# Patient Record
Sex: MALE | Race: WHITE
[De-identification: names, ages, dates, MRNs, and addresses within clinical notes are randomized per-mention and may not be internally consistent; named-entity substitution may affect disease eponyms.]

---

## 2018-04-13 ENCOUNTER — HOSPITAL ENCOUNTER (INPATIENT)
Dept: HOSPITAL 17 - NEPC | Age: 83
LOS: 5 days | Discharge: HOME | DRG: 175 | End: 2018-04-18
Attending: HOSPITALIST | Admitting: HOSPITALIST
Payer: MEDICARE

## 2018-04-13 VITALS
HEART RATE: 68 BPM | DIASTOLIC BLOOD PRESSURE: 75 MMHG | RESPIRATION RATE: 18 BRPM | SYSTOLIC BLOOD PRESSURE: 129 MMHG | TEMPERATURE: 98.1 F | OXYGEN SATURATION: 95 %

## 2018-04-13 VITALS — HEIGHT: 75 IN | BODY MASS INDEX: 31.99 KG/M2 | WEIGHT: 257.28 LBS

## 2018-04-13 DIAGNOSIS — I27.20: ICD-10-CM

## 2018-04-13 DIAGNOSIS — E55.9: ICD-10-CM

## 2018-04-13 DIAGNOSIS — I50.22: ICD-10-CM

## 2018-04-13 DIAGNOSIS — I82.443: ICD-10-CM

## 2018-04-13 DIAGNOSIS — E66.9: ICD-10-CM

## 2018-04-13 DIAGNOSIS — R31.9: ICD-10-CM

## 2018-04-13 DIAGNOSIS — R55: ICD-10-CM

## 2018-04-13 DIAGNOSIS — I82.4Z2: ICD-10-CM

## 2018-04-13 DIAGNOSIS — N40.1: ICD-10-CM

## 2018-04-13 DIAGNOSIS — I08.1: ICD-10-CM

## 2018-04-13 DIAGNOSIS — I49.3: ICD-10-CM

## 2018-04-13 DIAGNOSIS — Z87.891: ICD-10-CM

## 2018-04-13 DIAGNOSIS — M54.9: ICD-10-CM

## 2018-04-13 DIAGNOSIS — G89.21: ICD-10-CM

## 2018-04-13 DIAGNOSIS — R04.2: ICD-10-CM

## 2018-04-13 DIAGNOSIS — I48.91: ICD-10-CM

## 2018-04-13 DIAGNOSIS — Z96.653: ICD-10-CM

## 2018-04-13 DIAGNOSIS — E78.5: ICD-10-CM

## 2018-04-13 DIAGNOSIS — I11.0: ICD-10-CM

## 2018-04-13 DIAGNOSIS — I26.92: Primary | ICD-10-CM

## 2018-04-13 DIAGNOSIS — M10.9: ICD-10-CM

## 2018-04-13 DIAGNOSIS — I82.432: ICD-10-CM

## 2018-04-13 DIAGNOSIS — J18.9: ICD-10-CM

## 2018-04-13 DIAGNOSIS — R33.8: ICD-10-CM

## 2018-04-13 LAB
BASOPHILS # BLD AUTO: 0 TH/MM3 (ref 0–0.2)
BASOPHILS NFR BLD: 0.4 % (ref 0–2)
EOSINOPHIL # BLD: 0.1 TH/MM3 (ref 0–0.4)
EOSINOPHIL NFR BLD: 1 % (ref 0–4)
ERYTHROCYTE [DISTWIDTH] IN BLOOD BY AUTOMATED COUNT: 14.7 % (ref 11.6–17.2)
HCT VFR BLD CALC: 39.1 % (ref 39–51)
HGB BLD-MCNC: 13.2 GM/DL (ref 13–17)
LYMPHOCYTES # BLD AUTO: 1.6 TH/MM3 (ref 1–4.8)
LYMPHOCYTES NFR BLD AUTO: 14.2 % (ref 9–44)
MCH RBC QN AUTO: 31.7 PG (ref 27–34)
MCHC RBC AUTO-ENTMCNC: 33.7 % (ref 32–36)
MCV RBC AUTO: 94.2 FL (ref 80–100)
MONOCYTE #: 1.1 TH/MM3 (ref 0–0.9)
MONOCYTES NFR BLD: 9.4 % (ref 0–8)
NEUTROPHILS # BLD AUTO: 8.5 TH/MM3 (ref 1.8–7.7)
NEUTROPHILS NFR BLD AUTO: 75 % (ref 16–70)
PLATELET # BLD: 149 TH/MM3 (ref 150–450)
PMV BLD AUTO: 8.2 FL (ref 7–11)
RBC # BLD AUTO: 4.16 MIL/MM3 (ref 4.5–5.9)
WBC # BLD AUTO: 11.4 TH/MM3 (ref 4–11)

## 2018-04-13 PROCEDURE — 85610 PROTHROMBIN TIME: CPT

## 2018-04-13 PROCEDURE — 86901 BLOOD TYPING SEROLOGIC RH(D): CPT

## 2018-04-13 PROCEDURE — 87040 BLOOD CULTURE FOR BACTERIA: CPT

## 2018-04-13 PROCEDURE — 80053 COMPREHEN METABOLIC PANEL: CPT

## 2018-04-13 PROCEDURE — 84100 ASSAY OF PHOSPHORUS: CPT

## 2018-04-13 PROCEDURE — 94150 VITAL CAPACITY TEST: CPT

## 2018-04-13 PROCEDURE — 85730 THROMBOPLASTIN TIME PARTIAL: CPT

## 2018-04-13 PROCEDURE — 83880 ASSAY OF NATRIURETIC PEPTIDE: CPT

## 2018-04-13 PROCEDURE — 85027 COMPLETE CBC AUTOMATED: CPT

## 2018-04-13 PROCEDURE — 86900 BLOOD TYPING SEROLOGIC ABO: CPT

## 2018-04-13 PROCEDURE — 86850 RBC ANTIBODY SCREEN: CPT

## 2018-04-13 PROCEDURE — 87641 MR-STAPH DNA AMP PROBE: CPT

## 2018-04-13 PROCEDURE — 83735 ASSAY OF MAGNESIUM: CPT

## 2018-04-13 PROCEDURE — 84484 ASSAY OF TROPONIN QUANT: CPT

## 2018-04-13 PROCEDURE — 93005 ELECTROCARDIOGRAM TRACING: CPT

## 2018-04-13 PROCEDURE — 71275 CT ANGIOGRAPHY CHEST: CPT

## 2018-04-13 PROCEDURE — 85014 HEMATOCRIT: CPT

## 2018-04-13 PROCEDURE — 70450 CT HEAD/BRAIN W/O DYE: CPT

## 2018-04-13 PROCEDURE — 84443 ASSAY THYROID STIM HORMONE: CPT

## 2018-04-13 PROCEDURE — 80048 BASIC METABOLIC PNL TOTAL CA: CPT

## 2018-04-13 PROCEDURE — 87086 URINE CULTURE/COLONY COUNT: CPT

## 2018-04-13 PROCEDURE — 85025 COMPLETE CBC W/AUTO DIFF WBC: CPT

## 2018-04-13 PROCEDURE — 84439 ASSAY OF FREE THYROXINE: CPT

## 2018-04-13 PROCEDURE — 71045 X-RAY EXAM CHEST 1 VIEW: CPT

## 2018-04-13 PROCEDURE — 80061 LIPID PANEL: CPT

## 2018-04-13 PROCEDURE — 81001 URINALYSIS AUTO W/SCOPE: CPT

## 2018-04-13 PROCEDURE — 85018 HEMOGLOBIN: CPT

## 2018-04-13 PROCEDURE — 93970 EXTREMITY STUDY: CPT

## 2018-04-13 PROCEDURE — 82550 ASSAY OF CK (CPK): CPT

## 2018-04-13 PROCEDURE — 93306 TTE W/DOPPLER COMPLETE: CPT

## 2018-04-13 NOTE — PD
HPI


Chief Complaint:  Cardiac Complaint


Time Seen by Provider:  23:37


Travel History


International Travel<30 days:  No


Contact w/Intl Traveler<30days:  No


Traveled to known affect area:  No





History of Present Illness


HPI


82-year-old male arrives by EMS.  The patient experienced a syncope episode 

after urination.  He fell to the ground.  Evidently the son reported a loss of 

consciousness to EMS.  The patient states he does not believe he lost 

consciousness.  EMS reports patient was diaphoretic and pale on scene.  He is 

found to be in bigeminy rhythm.  He was given lidocaine which evidently 

converted to an atrial fibrillation type rhythm.  In the ER the patient has no 

complaints and states I feel normal right now.  He does note over the past 

month or so he said chronic shortness of breath.  Over the past 2 weeks is 

gotten worse.  Dyspnea on exertion is reported.  He denies lower extremity 

edema.  He denies change in urination frequency or volume.  No history of COPD 

or CHF.





PFSH


Past Medical History


Hx Anticoagulant Therapy:  Yes (aspirin )


Atrial Fibrillation:  Yes (recent)


Hypertension:  Yes


Medical other:  Yes (arthritis )


Tetanus Vaccination:  Unknown


Influenza Vaccination:  Yes





Social History


Alcohol Use:  Yes (rarely )


Tobacco Use:  No


Substance Use:  No





Allergies-Medications


(Allergen,Severity, Reaction):  


Coded Allergies:  


     No Known Allergies (Verified  Allergy, Unknown, 4/13/18)


Reported Meds & Prescriptions





Reported Meds & Active Scripts


Active


Reported


Allopurinol 300 Mg Tab 300 Mg PO DAILY


Losartan (Losartan Potassium) 50 Mg Tab 50 Mg PO DAILY


Vitamin D-3 (Cholecalciferol) 1,000 Unit Cap   


Cranberry Tablet (Cranberry Conc/C/Bacill Coag) 450 Mg-30 Mg-50 Million Cell 

Tablet   


Flomax (Tamsulosin HCl) 0.4 Mg Cap 0.4 Mg PO HS


Simvastatin 40 Mg Tab 40 Mg PO HS


Aspirin Low Dose (Aspirin) 81 Mg Chew 325 Mg CHEW DAILY








Review of Systems


Except as stated in HPI:  all other systems reviewed are Neg


General / Constitutional:  No: Fever


Eyes:  No: Diploplia





Physical Exam


Narrative


GENERAL: 82-year-old male pleasant well-nourished well-developed no acute 

distress





Vital Signs








  Date Time  Temp Pulse Resp B/P (MAP) Pulse Ox O2 Delivery O2 Flow Rate FiO2


 


4/13/18 23:18 98.1 68 18 129/75 (93) 95 Nasal Cannula 2.00 








SKIN: Warm and dry.


HEAD: Atraumatic. Normocephalic. 


EYES: Pupils equal and round. No scleral icterus. No injection or drainage. 


ENT: No nasal bleeding or discharge.  Mucous membranes pink and moist.


NECK: Trachea midline. No JVD. 


CARDIOVASCULAR: Irregular.  Rate about 80.


RESPIRATORY: No accessory muscle use. Clear to auscultation. Breath sounds 

equal bilaterally. 


GASTROINTESTINAL: Abdomen soft, non-tender, nondistended. Hepatic and splenic 

margins not palpable. 


MUSCULOSKELETAL: Extremities without clubbing, cyanosis, or edema. No obvious 

deformities. 


NEUROLOGICAL: Awake and alert. No obvious cranial nerve deficits.  Motor 

grossly within normal limits. Five out of 5 muscle strength in the arms and 

legs.  Normal speech.


PSYCHIATRIC: Appropriate mood and affect; insight and judgment normal.





Data


Data


Last Documented VS





Vital Signs








  Date Time  Temp Pulse Resp B/P (MAP) Pulse Ox O2 Delivery O2 Flow Rate FiO2


 


4/14/18 01:48  79 20 196/91 (126) 97 Nasal Cannula 2.00 


 


4/13/18 23:18 98.1       








Orders





 Orders


Electrocardiogram (4/13/18 23:37)


Complete Blood Count With Diff (4/13/18 23:37)


Comprehensive Metabolic Panel (4/13/18 23:37)


Magnesium (Mg) (4/13/18 23:37)


B-Type Natriuretic Peptide (4/13/18 23:37)


Ckmb (Isoenzyme) Profile (4/13/18 23:37)


Troponin I (4/13/18 23:37)


Act Partial Throm Time (Ptt) (4/13/18 23:37)


Prothrombin Time / Inr (Pt) (4/13/18 23:37)


Urinalysis - C+S If Indicated (4/13/18 23:37)


Chest, Single Ap (4/13/18 23:37)


Ct Brain W/O Iv Contrast(Rout) (4/13/18 23:37)


Blood Glucose (4/13/18 23:37)


Ecg Monitoring (4/13/18 23:37)


Iv Access Insert/Monitor (4/13/18 23:37)


Oximetry (4/13/18 23:37)


Ondansetron Inj (Zofran Inj) (4/13/18 23:45)


Sodium Chloride 0.9% Flush (Ns Flush) (4/13/18 23:45)


Orthostatic Vital Signs (4/13/18 23:37)


Ceftriaxone Inj (Rocephin Inj) (4/14/18 01:30)


Blood Culture (4/14/18 01:29)


Azithromycin Inj (Zithromax Inj) (4/14/18 01:30)


Metoprolol Tartrate (Lopressor) (4/14/18 09:00)


Echo 2d Comp With Doppler (4/14/18 )


Consult Cardiology (4/14/18 )


Place In Observation (4/14/18 )


Vital Signs (Adult) Q4H (4/14/18 02:03)


Activity Oob With Assistance (4/14/18 02:03)


Cardiac Monitor / Telemetry .CONTINUOUS (4/14/18 02:03)


Intake + Output NICOLETTE.QSHIFT (4/14/18 02:03)


Diet Heart Healthy (4/14/18 Breakfast)


Sodium Chloride 0.9% Flush (Ns Flush) (4/14/18 02:15)


Sodium Chloride 0.9% Flush (Ns Flush) (4/14/18 09:00)


Ondansetron Inj (Zofran Inj) (4/14/18 02:15)


Comprehensive Metabolic Panel (4/15/18 06:00)


Complete Blood Count With Diff (4/15/18 06:00)


Troponin I (4/14/18 06:00)


Troponin I (4/14/18 12:00)


Case Management Consult (4/14/18 02:03)


Scd Bilateral/Knee High NICOLETTE.BID (4/14/18 02:03)


Jeffery Bilateral/Knee High NICOLETTE.QSHIFT (4/14/18 02:06)


Acetaminophen (Tylenol) (4/14/18 02:15)


Acetamin-Hydrocod 325-5 Mg (Norco  5-325 (4/14/18 02:15)


Morphine Inj (Morphine Inj) (4/14/18 02:15)


Docusate Sodium-Senna (Rayna-Colace) (4/14/18 09:00)


Magnesium Hydroxide Liq (Milk Of Magnesi (4/14/18 02:15)


Sennosides (Senokot) (4/14/18 02:15)


Bisacodyl Supp (Dulcolax Supp) (4/14/18 02:15)


Lactulose Liq (Lactulose Liq) (4/14/18 02:15)


Electrocardiogram (4/14/18 06:00)


Ceftriaxone Inj (Rocephin Inj) (4/14/18 23:00)


Azithromycin Inj (Zithromax Inj) (4/14/18 23:00)


Tamsulosin (Flomax) (4/14/18 21:00)


Pravastatin (Pravachol) (4/14/18 21:00)


Thyroid Stimulating Hormone (4/14/18 06:00)


Aspirin (Aspirin) (4/14/18 09:00)


Admit Order (Ed Use Only) (4/14/18 02:15)





Labs





Laboratory Tests








Test


  4/13/18


23:43


 


White Blood Count 11.4 TH/MM3 


 


Red Blood Count 4.16 MIL/MM3 


 


Hemoglobin 13.2 GM/DL 


 


Hematocrit 39.1 % 


 


Mean Corpuscular Volume 94.2 FL 


 


Mean Corpuscular Hemoglobin 31.7 PG 


 


Mean Corpuscular Hemoglobin


Concent 33.7 % 


 


 


Red Cell Distribution Width 14.7 % 


 


Platelet Count 149 TH/MM3 


 


Mean Platelet Volume 8.2 FL 


 


Neutrophils (%) (Auto) 75.0 % 


 


Lymphocytes (%) (Auto) 14.2 % 


 


Monocytes (%) (Auto) 9.4 % 


 


Eosinophils (%) (Auto) 1.0 % 


 


Basophils (%) (Auto) 0.4 % 


 


Neutrophils # (Auto) 8.5 TH/MM3 


 


Lymphocytes # (Auto) 1.6 TH/MM3 


 


Monocytes # (Auto) 1.1 TH/MM3 


 


Eosinophils # (Auto) 0.1 TH/MM3 


 


Basophils # (Auto) 0.0 TH/MM3 


 


CBC Comment DIFF FINAL 


 


Differential Comment  


 


Prothrombin Time 11.8 SEC 


 


Prothromb Time International


Ratio 1.2 RATIO 


 


 


Activated Partial


Thromboplast Time 24.4 SEC 


 


 


Blood Urea Nitrogen 22 MG/DL 


 


Creatinine 1.15 MG/DL 


 


Random Glucose 138 MG/DL 


 


Total Protein 7.1 GM/DL 


 


Albumin 2.6 GM/DL 


 


Calcium Level 8.1 MG/DL 


 


Magnesium Level 2.1 MG/DL 


 


Alkaline Phosphatase 72 U/L 


 


Aspartate Amino Transf


(AST/SGOT) 22 U/L 


 


 


Alanine Aminotransferase


(ALT/SGPT) 31 U/L 


 


 


Total Bilirubin 0.4 MG/DL 


 


Sodium Level 139 MEQ/L 


 


Potassium Level 3.8 MEQ/L 


 


Chloride Level 106 MEQ/L 


 


Carbon Dioxide Level 23.0 MEQ/L 


 


Anion Gap 10 MEQ/L 


 


Estimat Glomerular Filtration


Rate 61 ML/MIN 


 


 


Total Creatine Kinase 39 U/L 


 


Troponin I 0.04 NG/ML 


 


B-Type Natriuretic Peptide 295 PG/ML 











MDM


Medical Decision Making


Medical Screen Exam Complete:  Yes


Emergency Medical Condition:  Yes


Medical Record Reviewed:  Yes


Differential Diagnosis


Intracranial hemorrhage, pneumonia, arrhythmia anemia electro imbalance


Narrative Course


CBC & BMP Diagram


4/13/18 23:43








Total Protein 7.1, Albumin 2.6 L, Calcium Level 8.1 L, Magnesium Level 2.1, 

Alkaline Phosphatase 72, Aspartate Amino Transf (AST/SGOT) 22, Alanine 

Aminotransferase (ALT/SGPT) 31, Total Bilirubin 0.4





Tn 0.04








EKG: irregular rhythm, rate 93








Last Impressions








Head CT 4/13/18 2337 Signed





Impressions: 





 Service Date/Time:  Saturday, April 14, 2018 00:02 - CONCLUSION:  1. No 

evidence 





 of acute intracranial pathology. No masses are identified.     David Armstrong MD 


 


Chest X-Ray 4/13/18 2337 Signed





Impressions: 





 Service Date/Time:  Friday, April 13, 2018 23:57 - CONCLUSION:  1. 

Cardiomegaly 





 2.  Opacity laterally in the right upper lobe characteristic of pneumonia. 





 Followup examination to insure clearing is recommended.       David Armstrong MD 





Patient has a pneumonia.  Concern for a diagnosis of CHF.  Patient received 

Rocephin azithromycin and Lasix.  The patient reports pending consult with Dr Hatch of cardiology. 


D/w Dr Broussard





Diagnosis





 Primary Impression:  


 Syncope


 Qualified Codes:  R55 - Syncope and collapse


 Additional Impressions:  


 PNA (pneumonia)


 Qualified Codes:  J18.9 - Pneumonia, unspecified organism


 Arrhythmia


 Qualified Codes:  I49.9 - Cardiac arrhythmia, unspecified





Admitting Information


Admitting Physician Requests:  Observation











Flynn Ghotra MD Apr 13, 2018 23:57

## 2018-04-14 VITALS
RESPIRATION RATE: 19 BRPM | HEART RATE: 64 BPM | SYSTOLIC BLOOD PRESSURE: 149 MMHG | OXYGEN SATURATION: 96 % | DIASTOLIC BLOOD PRESSURE: 92 MMHG

## 2018-04-14 VITALS
DIASTOLIC BLOOD PRESSURE: 69 MMHG | TEMPERATURE: 98 F | SYSTOLIC BLOOD PRESSURE: 136 MMHG | OXYGEN SATURATION: 96 % | RESPIRATION RATE: 18 BRPM | HEART RATE: 63 BPM

## 2018-04-14 VITALS
SYSTOLIC BLOOD PRESSURE: 163 MMHG | HEART RATE: 59 BPM | DIASTOLIC BLOOD PRESSURE: 72 MMHG | RESPIRATION RATE: 20 BRPM | OXYGEN SATURATION: 96 % | TEMPERATURE: 98 F

## 2018-04-14 VITALS — HEART RATE: 76 BPM

## 2018-04-14 VITALS
TEMPERATURE: 97.5 F | SYSTOLIC BLOOD PRESSURE: 143 MMHG | HEART RATE: 70 BPM | OXYGEN SATURATION: 95 % | DIASTOLIC BLOOD PRESSURE: 94 MMHG

## 2018-04-14 VITALS
OXYGEN SATURATION: 98 % | RESPIRATION RATE: 18 BRPM | DIASTOLIC BLOOD PRESSURE: 73 MMHG | HEART RATE: 61 BPM | SYSTOLIC BLOOD PRESSURE: 143 MMHG

## 2018-04-14 VITALS
RESPIRATION RATE: 20 BRPM | DIASTOLIC BLOOD PRESSURE: 91 MMHG | OXYGEN SATURATION: 97 % | SYSTOLIC BLOOD PRESSURE: 196 MMHG | HEART RATE: 79 BPM

## 2018-04-14 VITALS
SYSTOLIC BLOOD PRESSURE: 155 MMHG | RESPIRATION RATE: 18 BRPM | OXYGEN SATURATION: 97 % | HEART RATE: 72 BPM | DIASTOLIC BLOOD PRESSURE: 85 MMHG | TEMPERATURE: 97.6 F

## 2018-04-14 VITALS
SYSTOLIC BLOOD PRESSURE: 155 MMHG | OXYGEN SATURATION: 94 % | TEMPERATURE: 97.4 F | HEART RATE: 57 BPM | DIASTOLIC BLOOD PRESSURE: 78 MMHG

## 2018-04-14 VITALS
DIASTOLIC BLOOD PRESSURE: 79 MMHG | RESPIRATION RATE: 18 BRPM | SYSTOLIC BLOOD PRESSURE: 156 MMHG | OXYGEN SATURATION: 94 % | HEART RATE: 67 BPM

## 2018-04-14 VITALS
DIASTOLIC BLOOD PRESSURE: 62 MMHG | SYSTOLIC BLOOD PRESSURE: 130 MMHG | RESPIRATION RATE: 18 BRPM | HEART RATE: 59 BPM | OXYGEN SATURATION: 96 %

## 2018-04-14 VITALS — HEART RATE: 60 BPM

## 2018-04-14 VITALS — HEART RATE: 68 BPM

## 2018-04-14 VITALS — OXYGEN SATURATION: 97 %

## 2018-04-14 VITALS — HEART RATE: 70 BPM

## 2018-04-14 LAB
ALBUMIN SERPL-MCNC: 2.6 GM/DL (ref 3.4–5)
ALP SERPL-CCNC: 72 U/L (ref 45–117)
ALT SERPL-CCNC: 31 U/L (ref 12–78)
AST SERPL-CCNC: 22 U/L (ref 15–37)
BILIRUB SERPL-MCNC: 0.4 MG/DL (ref 0.2–1)
BUN SERPL-MCNC: 22 MG/DL (ref 7–18)
CALCIUM SERPL-MCNC: 8.1 MG/DL (ref 8.5–10.1)
CHLORIDE SERPL-SCNC: 106 MEQ/L (ref 98–107)
CREAT SERPL-MCNC: 1.15 MG/DL (ref 0.6–1.3)
GFR SERPLBLD BASED ON 1.73 SQ M-ARVRAT: 61 ML/MIN (ref 89–?)
GLUCOSE SERPL-MCNC: 138 MG/DL (ref 74–106)
HCO3 BLD-SCNC: 23 MEQ/L (ref 21–32)
INR PPP: 1.2 RATIO
MAGNESIUM SERPL-MCNC: 2.1 MG/DL (ref 1.5–2.5)
PROT SERPL-MCNC: 7.1 GM/DL (ref 6.4–8.2)
PROTHROMBIN TIME: 11.8 SEC (ref 9.8–11.6)
SODIUM SERPL-SCNC: 139 MEQ/L (ref 136–145)
TROPONIN I SERPL-MCNC: 0.04 NG/ML (ref 0.02–0.05)
TROPONIN I SERPL-MCNC: 0.05 NG/ML (ref 0.02–0.05)

## 2018-04-14 RX ADMIN — PRAVASTATIN SODIUM SCH MG: 80 TABLET ORAL at 20:53

## 2018-04-14 RX ADMIN — METOPROLOL TARTRATE SCH MG: 25 TABLET, FILM COATED ORAL at 20:53

## 2018-04-14 RX ADMIN — STANDARDIZED SENNA CONCENTRATE AND DOCUSATE SODIUM SCH TAB: 8.6; 5 TABLET, FILM COATED ORAL at 09:59

## 2018-04-14 RX ADMIN — TAMSULOSIN HYDROCHLORIDE SCH MG: 0.4 CAPSULE ORAL at 20:53

## 2018-04-14 RX ADMIN — METOPROLOL TARTRATE SCH MG: 25 TABLET, FILM COATED ORAL at 09:59

## 2018-04-14 RX ADMIN — ENOXAPARIN SODIUM SCH MG: 100 INJECTION SUBCUTANEOUS at 16:57

## 2018-04-14 RX ADMIN — Medication SCH ML: at 09:59

## 2018-04-14 RX ADMIN — AZITHROMYCIN SCH MLS/HR: 500 INJECTION, POWDER, LYOPHILIZED, FOR SOLUTION INTRAVENOUS at 23:56

## 2018-04-14 RX ADMIN — SODIUM CHLORIDE SCH MLS/HR: 900 INJECTION INTRAVENOUS at 23:19

## 2018-04-14 RX ADMIN — Medication SCH ML: at 20:54

## 2018-04-14 RX ADMIN — STANDARDIZED SENNA CONCENTRATE AND DOCUSATE SODIUM SCH TAB: 8.6; 5 TABLET, FILM COATED ORAL at 20:53

## 2018-04-14 NOTE — HHI.PR
Subjective


Remarks


telemetry- with occasional PVCs


per patient increasing shortness of breath for past 3 weeeks this last week 

even with 20-30 ft distance


experienced some chest discomfort- sharp with deep insipiration- right sided 

that has resolved 2 days ago


had episodes of blood streak sputum





seen by Dr. Hatch - ordered a CTA + PE


patient denies any history of GI bleeding, or any bleeding tendencies


no recent major surgeries





Objective


Vitals





Vital Signs








  Date Time  Temp Pulse Resp B/P (MAP) Pulse Ox O2 Delivery O2 Flow Rate FiO2


 


4/14/18 18:15 98.0 59 20 163/72 (102) 96   


 


4/14/18 15:14  64 19 149/92 (111) 96 Nasal Cannula 2.00 


 


4/14/18 09:30  61 18 143/73 (96) 98 Nasal Cannula 3.00 


 


4/14/18 08:30 97.6 72 18 155/85 (108) 97 Nasal Cannula 3.00 


 


4/14/18 04:51 98.0 63 18 136/69 (91) 96 Nasal Cannula 3.00 


 


4/14/18 03:01  67 18 156/79 (104) 94 Nasal Cannula 3.00 


 


4/14/18 01:48  79 20 196/91 (126) 97 Nasal Cannula 2.00 


 


4/14/18 00:24  59 18 130/62 (84) 96 Nasal Cannula 2.00 


 


4/14/18 00:18     97 Nasal Cannula 2.00 


 


4/13/18 23:21  65 28  93 Nasal Cannula 2.00 


 


4/13/18 23:18 98.1 68 18 129/75 (93) 95 Nasal Cannula 2.00 














I/O      


 


 4/13/18 4/13/18 4/13/18 4/14/18 4/14/18 4/14/18





 07:00 15:00 23:00 07:00 15:00 23:00


 


Intake Total    350 ml  


 


Balance    350 ml  


 


      


 


Intake IV Total    350 ml  








Result Diagram:  


4/13/18 2343                                                                   

             4/13/18 2343





Imaging





Last Impressions








CT Angiography 4/14/18 1505 Signed





Impressions: 





 Service Date/Time:  Saturday, April 14, 2018 15:51 - CONCLUSION:  Extensive 





 bilateral pulmonary embolism.      Austen Olmos MD 


 


Lower Extremity Ultrasound 4/14/18 0000 Signed





Impressions: 





 Service Date/Time:  Saturday, April 14, 2018 17:23 - CONCLUSION:  Bilateral 





 lower extremity DVT as above.     Austen Goncalves MD 


 


Head CT 4/13/18 2337 Signed





Impressions: 





 Service Date/Time:  Saturday, April 14, 2018 00:02 - CONCLUSION:  1. No 

evidence 





 of acute intracranial pathology. No masses are identified.     David Armstrong MD 


 


Chest X-Ray 4/13/18 2337 Signed





Impressions: 





 Service Date/Time:  Friday, April 13, 2018 23:57 - CONCLUSION:  1. 

Cardiomegaly 





 2.  Opacity laterally in the right upper lobe characteristic of pneumonia. 





 Followup examination to insure clearing is recommended.       David Armstrong MD 








Objective Remarks


awake and alert, tachypneic


telemetry- with occasinal PVc


decrease breath sounds, no rales


regular rhythm, PVcs


abdomen- globularly soft, nontender


extremiteis no edema, no calf tenderness





A/P


Assessment and Plan


A/P: 82 years old male





Acute Bilateral Pulmonary embolism


- no bleeding tendencies/history, no recent major surgeries


-started on Lovenox q 12


- get Doppler of both LE- check for DVT


- 02 NC





Chest pain- likely from PE


Syncope likely from PE


-Cardiology ff


- ff lytes


- continue meds





PNA:  CXR w/ RUL PNA,


-will continue w/ IV Abx.


DVT Prophylaxis:  - on Lovenox for above 





Will do walk test prior to DC





ADD:


 reviewed CTA-  extensive PE on description with thin SADDLE  embolus


-consult intensivist to evaluate for possible candidate for  TPA-


- called and d/w  Dr. Gifford - 


[place] [facility] [place]











Kalyn Gómez MD Apr 14, 2018 18:24

## 2018-04-14 NOTE — RADRPT
EXAM DATE/TIME:  04/13/2018 23:57 

 

[facility] COMPARISON:     

No previous studies available for comparison.

 

                     

INDICATIONS :     

Syncopal episode.

                     

 

MEDICAL HISTORY :     

None.          

 

SURGICAL HISTORY :     

None.   

 

ENCOUNTER:     

Initial                                        

 

ACUITY:     

1 day      

 

PAIN SCORE:     

0/10

 

LOCATION:     

Bilateral chest 

 

FINDINGS:     

The cardiac silhouette is enlarged in transverse diameter. There is prominence of the aortic knob is 
with calcification characteristic of atherosclerotic vascular disease. There is a small area of alveo
lar opacity laterally in the right upper lobe adjacent to the minor fissure characteristic of pneumon
ia. No pleural effusions are identified.

 

CONCLUSION:     

1. Cardiomegaly

2.  Opacity laterally in the right upper lobe characteristic of pneumonia. Followup examination to in
sure clearing is recommended. 

 

 

 

 

 David Armstrong MD on April 14, 2018 at 0:15           

Board Certified Radiologist.

 This report was verified electronically.

## 2018-04-14 NOTE — HHI.HP
__________________________________________________





HPI


[facility]


[facility]


Primary Care Physician


Silva Katz MD


Admission Diagnosis





SYNCOPE,CHF,PNA


Diagnoses:  


(1) Syncope


Diagnosis:  Principal





(2) PNA (pneumonia)


Diagnosis:  Principal





(3) Arrhythmia


Diagnosis:  Principal





Travel History


International Travel<30 Days:  No


Contact w/Intl Traveler <30 Da:  No


Traveled to Known Affected Are:  No


History of Present Illness


This is an 82-year-old male with a PMH of HTN who is brought to the ER by EMS 

after an apparent syncopal event.  Per patient, he had gone to the bathroom and 

when he got up from the toilet felt significant dizziness and SOB, states he 

tried to sit at the edge of the tub to catch his breath, however Son reports pt 

had syncopal event.  No seizure activity reported.  No previous h/o similar 

symptoms.  Upon EMS arrival, pt noted to be in bigeminy, given Lidocaine w/ 

conversion to A-fib.  Pt states he's been following w/ his PCP, Dr. Katz, 

for SOB which started approx 2mo ago, at that time was referred for EKG and 

states the tech told him he was in A-fib, however never officially diagnosed.  

Was supposed to be referred to Dr. Duncan on Monday for further evaluation.  

Pt notes progressive SOB, first 100ft, then 50ft, now reports SOB w/ few feet.  

Denies fever, chills.  Reports occasional non-productive cough.  No edema.  On 

arrival, /75, HR 68, O2 sat 95% on 2L NC, Afebrile.  WBC 11.4.  Platelets 

149, no previous labs for comparison.  BUN 22, GFR 61.  .  Troponin 

0.04.  INR 1.2.  CXR with cardiomegaly, opacity right upper lobe characteristic 

of pneumonia.  CT Head with no acute findings.  S/p Rocephin/Zithro in ER.  

Currently without complaints.





Review of Systems


Except as stated in HPI:  all other systems reviewed are Neg


ROS: 14 point review of systems otherwise negative.





Past Family Social History


Past Medical History


PMH: HTN


Past Surgical History


PAST SURGICAL HISTORY: Bilateral Knee Replacement


Allergies:  


Coded Allergies:  


     No Known Allergies (Verified  Allergy, Unknown, 18)


Family History


PAST FAMILY HISTORY:  Reviewed.  No h/o DM or CAD


Social History


PAST SOCIAL HISTORY: Occasional alcohol.  Negative for tobacco or drugs.





Physical Exam


Vital Signs





Vital Signs








  Date Time  Temp Pulse Resp B/P (MAP) Pulse Ox O2 Delivery O2 Flow Rate FiO2


 


18 01:48  79 20 196/91 (126) 97 Nasal Cannula 2.00 


 


18 00:24  59 18 130/62 (84) 96 Nasal Cannula 2.00 


 


18 00:18     97 Nasal Cannula 2.00 


 


18 23:18 98.1 68 18 129/75 (93) 95 Nasal Cannula 2.00 








Physical Exam


PE:


GENERAL: Extremely pleasant elderly white male in no acute distress.


HEENT: PERRLA, EOMI. No scleral icterus or conjunctival pallor. No lid lag or 

facial droop.  


CARDIOVASCULAR: Irregular, HR 90's.  No obvious murmurs to auscultation. No 

chest tenderness to palpation. 


RESPIRATORY: No obvious rhonchi or wheezing. Clear to auscultation. Breath 

sounds equal bilaterally. 


GASTROINTESTINAL: Abdomen soft, non-tender, nondistended. BS normal. 


MUSCULOSKELETAL: Extremities without clubbing, cyanosis, or edema. No obvious 

deformities. 


NEUROLOGICAL: Awake, alert and oriented x4. No focal neurologic deficits. 

Moving both upper and lower extremities spontaneously.


Laboratory





Laboratory Tests








Test


  18


23:43


 


White Blood Count 11.4 


 


Red Blood Count 4.16 


 


Hemoglobin 13.2 


 


Hematocrit 39.1 


 


Mean Corpuscular Volume 94.2 


 


Mean Corpuscular Hemoglobin 31.7 


 


Mean Corpuscular Hemoglobin


Concent 33.7 


 


 


Red Cell Distribution Width 14.7 


 


Platelet Count 149 


 


Mean Platelet Volume 8.2 


 


Neutrophils (%) (Auto) 75.0 


 


Lymphocytes (%) (Auto) 14.2 


 


Monocytes (%) (Auto) 9.4 


 


Eosinophils (%) (Auto) 1.0 


 


Basophils (%) (Auto) 0.4 


 


Neutrophils # (Auto) 8.5 


 


Lymphocytes # (Auto) 1.6 


 


Monocytes # (Auto) 1.1 


 


Eosinophils # (Auto) 0.1 


 


Basophils # (Auto) 0.0 


 


CBC Comment DIFF FINAL 


 


Differential Comment  


 


Prothrombin Time 11.8 


 


Prothromb Time International


Ratio 1.2 


 


 


Activated Partial


Thromboplast Time 24.4 


 


 


Blood Urea Nitrogen 22 


 


Creatinine 1.15 


 


Random Glucose 138 


 


Total Protein 7.1 


 


Albumin 2.6 


 


Calcium Level 8.1 


 


Magnesium Level 2.1 


 


Alkaline Phosphatase 72 


 


Aspartate Amino Transf


(AST/SGOT) 22 


 


 


Alanine Aminotransferase


(ALT/SGPT) 31 


 


 


Total Bilirubin 0.4 


 


Sodium Level 139 


 


Potassium Level 3.8 


 


Chloride Level 106 


 


Carbon Dioxide Level 23.0 


 


Anion Gap 10 


 


Estimat Glomerular Filtration


Rate 61 


 


 


Total Creatine Kinase 39 


 


Troponin I 0.04 


 


B-Type Natriuretic Peptide 295 














 Date/Time


Source Procedure


Growth Status


 


 


 18 01:45


Blood Peripheral Aerobic Blood Culture


Pending Received


 


 18 01:45


Blood Peripheral Anaerobic Blood Culture


Pending Received








Result Diagram:  


18 2343                                                                   

             18 2343








Caprinarcadio VTE Risk Assessment


Caprini VTE Risk Assessment:  No/Low Risk (score <= 1)


Caprini Risk Assessment Model











 Point Value = 1          Point Value = 2  Point Value = 3  Point Value = 5


 


Age 41-60


Minor surgery


BMI > 25 kg/m2


Swollen legs


Varicose veins


Pregnancy or postpartum


History of unexplained or recurrent


   spontaneous 


Oral contraceptives or hormone


   replacement


Sepsis (< 1 month)


Serious lung disease, including


   pneumonia (< 1 month)


Abnormal pulmonary function


Acute myocardial infarction


Congestive heart failure (< 1 month)


History of inflammatory bowel disease


Medical patient at bed rest Age 61-74


Arthroscopic surgery


Major open surgery (> 45 min)


Laparoscopic surgery (> 45 min)


Malignancy


Confined to bed (> 72 hours)


Immobilizing plaster cast


Central venous access Age >= 75


History of VTE


Family history of VTE


Factor V Leiden


Prothrombin 36232J


Lupus anticoagulant


Anticardiolipin antibodies


Elevated serum homocysteine


Heparin-induced thrombocytopenia


Other congenital or acquired


   thrombophilia Stroke (< 1 month)


Elective arthroplasty


Hip, pelvis, or leg fracture


Acute spinal cord injury (< 1 month)








Prophylaxis Regimen











   Total Risk


Factor Score Risk Level Prophylaxis Regimen


 


0-1      Low Early ambulation


 


2 Moderate Order ONE of the following:


*Sequential Compression Device (SCD)


*Heparin 5000 units SQ BID


 


3-4 Higher Order ONE of the following medications:


*Heparin 5000 units SQ TID


*Enoxaparin/Lovenox 40 mg SQ daily (WT < 150 kg, CrCl > 30 mL/min)


*Enoxaparin/Lovenox 30 mg SQ daily (WT < 150 kg, CrCl > 10-29 mL/min)


*Enoxaparin/Lovenox 30 mg SQ BID (WT < 150 kg, CrCl > 30 mL/min)


AND/OR


*Sequential Compression Device (SCD)


 


5 or more Highest Order ONE of the following medications:


*Heparin 5000 units SQ TID (Preferred with Epidurals)


*Enoxaparin/Lovenox 40 mg SQ daily (WT < 150 kg, CrCl > 30 mL/min)


*Enoxaparin/Lovenox 30 mg SQ daily (WT < 150 kg, CrCl > 10-29 mL/min)


*Enoxaparin/Lovenox 30 mg SQ BID (WT < 150 kg, CrCl > 30 mL/min)


AND


*Sequential Compression Device (SCD)











Assessment and Plan


Problem List:  


(1) Syncope


ICD Code:  R55 - Syncope and collapse


(2) Arrhythmia


ICD Code:  I49.9 - Cardiac arrhythmia, unspecified


(3) PNA (pneumonia)


ICD Code:  J18.9 - Pneumonia, unspecified organism


Assessment and Plan


A/P:


1.  Syncope:  acute lightheadedness/dizziness w/ subsequent syncopal event, no 

head trauma reported.  CT Head w/ no acute findings, images reviewed by me.  

Admit for Observation, Telemetry, check Echo to eval for underlying 

cardiomyopathy/valvular abnormality.  Initial trop 0.04, will check serial 

cardiac enzymes to eval for possible ischemia.  Was to follow w/ Dr. Duncan 

as outpatient, will consult for further evaluation. 


2.  Arrhythmia:  noted to be in Bigeminy per EMS, s/p Lidocaine w/ conversion 

to A-fib, episode of A-fib approx 2 mo ago but no formal diagnosis per patient.

  Telemetry, monitor electrolytes.  Start Metoprolol.  Check Echo as above.  

Consult Cardiology for further eval.  Repeat EKG w/ next set of trop. 


3.  PNA:  CXR w/ RUL PNA, notes cough, SOB and pleuritic pain.  S/p Rocephin/

Zithro in ER, will continue w/ IV Abx.


4.  DVT Prophylaxis:  SCD/Teds


5.  Social work for d/c planning as needed


6.  Case discussed w/ ER physician at length, labs/records/imaging reviewed by 

me.











Shira Broussard MD 2018 02:59

## 2018-04-14 NOTE — RADRPT
EXAM DATE/TIME:  04/14/2018 17:23 

 

[facility] COMPARISON:     

No previous studies available for comparison.

        

 

 

INDICATIONS :                

Bilateral leg swelling. 

            

 

MEDICAL HISTORY :     

Hypertension.   Anticoagulant therapy. Atrial fibrillation. Arthritis. 

 

SURGICAL HISTORY :      

Bilateral knee replacements. 

 

ENCOUNTER:     

Initial

 

ACUITY:     

1 day

 

PAIN SCORE:      

0/10

 

LOCATION:      

Bilateral  legs. 

                       

 

TECHNIQUE:     

Venous ultrasound of the left and right leg was performed from the inguinal ligament to the proximal 
calf.  Real-time, color Doppler and spectral tracing, compression and augmentation techniques were us
ed.  

 

FINDINGS:     

 

RIGHT LEG:     

There is nonocclusive thrombus in the right posterior tibial vein. Other venous tributaries of the ri
t lower extremity are patent. 

 

LEFT LEG:     

There is occlusive thrombus in the left peroneal vein. There is nonocclusive thrombus in the left pop
liteal and posterior tibial veins. Other venous tributaries of the left lower extremity are patent. 

 

CONCLUSION:     

Bilateral lower extremity DVT as above.

 

 

 

 Austen Goncalves MD on April 14, 2018 at 18:03           

Board Certified Radiologist.

 This report was verified electronically.

## 2018-04-14 NOTE — MB
cc:

Rigoberto Hatch MD, Prady M MD

****

 

 

DATE:

2018

 

REASON FOR CONSULTATION:

Atrial fibrillation, syncope.

 

HISTORY OF PRESENT ILLNESS:

Mr. Pena is a pleasant 82-year-old gentleman with history of 

hypertension and hyperlipidemia.  No history of diabetes.  He used to 

smoke but stopped 60 years ago.  No history of diabetes or immediate 

family history of coronary artery disease at premature age.  Recently 

diagnosed with atrial fibrillation and has been progressively more 

short of breath over the past 1-2 weeks to the point that he used to 

get dyspneic on exertion at the end of 1 block, now he can only walk a

few feet and gets short of breath.  He has been having some cough and 

sputum production, sometimes with streaks of blood.  He has been 

having chest discomfort when he takes a deep breath.  Denies angina or

any palpitations.  He has been getting dizzy with his shortness of 

breath.  He came in because of a syncopal or a near-syncopal episode 

that happened after he went to the bathroom and he felt wobbly and 

wanted to sit at the edge of the top and then slipped down.  His wife 

was watching and she does not feel like he had a complete syncopal 

spell; however, his "his eyes were rolling."  There was no preceding 

chest pains or palpitations.  He had some urinary incontinence, but no

tongue biting or convulsions noted.  He was taken by [facility] then and was

told he had "bigeminy."  Denies lower extremity edema, but he has been

progressively short of breath, as I mentioned, over the past 1-2 

weeks.

 

REVIEW OF SYSTEMS:

A 12-point system review was unremarkable, except as mentioned in the 

history of present illness.  Denies fever.  Admits to cough and sputum

production as mentioned.  No prior history of seizures.  No prior 

history of congestive heart failure or obstructive sleep apnea 

diagnosis.

 

MEDICATIONS AT HOME:

Includes the followin.  Losartan 50 mg p.o. daily.

2.  Simvastatin 40 mg p.o. at bedtime.

3.  Flomax 0.4 mg p.o. at bedtime, which he has been on for a period 

of time.

4.  Allopurinol 300 mg p.o. daily.

PAST MEDICAL AND SURGICAL HISTORY:

Includes as mentioned above.  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  

Left femur fracture, surgical intervention.  Benign prostatic 

hypertrophy with elevated PSA, which came down with the Flomax 

therapy.  He is followed by urology.

 

FAMILY HISTORY:

Negative for chronic disease, cancer, diabetes, or hypertension.

 

SOCIAL HISTORY:

Denies smoking.  He does drink a large beer a day and occasionally he 

will take a glass of whiskey as well.  Denies recreational drug abuse.

 He is  and lives with his wife.

 

PHYSICAL EXAMINATION:

GENERAL:  An 82-year-old gentleman lying in bed, in no apparent 

distress, alert and oriented x 3, answers questions appropriately.

VITAL SIGNS:  Blood pressure is 140/70 mmHg, pulse of 60 beats per 

minute, irregularly irregular, respiration at 20 per minute, afebrile.

HEENT:  Shows head is normocephalic.  Pupils equal, reactive.  Throat 

is within normal limits.

NECK:  Supple.  No carotid bruit.  No thyromegaly.  No jugular venous 

distention noted.

LUNGS:  Few crepitations noted at the bases bilaterally, more on the 

left base.

HEART:  S1, S2 are variable in intensity and distant with a faint S4 

gallops.

ABDOMEN:  Somewhat obese but lax, nontender.  Normoactive bowel 

sounds.  No organomegaly, no masses felt.

EXTREMITIES:  No clubbing, cyanosis or edema.  Pulses 2+ bilaterally 

and no bruit noted.

NEUROLOGIC:  Grossly intact with no focal deficits.

RECTAL EXAM:  Deferred.

 

DIAGNOSTIC STUDIES:

EKG shows atrial fibrillation with occasional PVCs versus aberrantly 

conducted beats and nonspecific ST-T wave changes.  Controlled 

ventricular response rate.

 

Laboratories shows a sodium 139, potassium 3.8, BUN of 22, creatinine 

1.15.  Nonspecific BNP elevation at 295.  His troponin I x 3 is 

normal.  TSH is 1.04, magnesium of 2.1.  Coags were within normal 

limits.  CBC shows a white count of 11.4, hemoglobin of 13.2 and a 

platelet count of 149.  He had a chest x-ray that reported 

"cardiomegaly" and also right upper lobe "pneumonia".

 

ASSESSMENT AND RECOMMENDATIONS:

Progressive shortness of breath on top of atrial fibrillation; 

however, with occasional episodes of some degree of mild hemoptysis 

with pleuritic chest pain and progressive symptoms of shortness of 

breath for which a CTA to rule out pulmonary embolism will be ordered.

 I understand that his chest x-ray reported pneumonia; however, PE 

needs to be ruled out because of the above reasons.

 

Complete set of electrolytes as well as a free T4 levels will be 

checked.

 

I would continue her current regimen for now.  However, if his CT 

angiogram is indicative of pulmonary emboli, then he should be fully 

anticoagulated.  Of course with close eye on his streaks of blood in 

his sputum.

 

CTA of the chest will also help identify any masses.

 

Regarding his syncope, on telemetry, he is in atrial fibrillation with

premature ventricular contractions in some runs that are multifocal 

but no significant arrhythmia so far to explain his syncope.  An 

echocardiogram is ordered already and it will be checked.

 

He will benefit from obstructive sleep apnea evaluation as an 

outpatient.

 

He will also benefit from stress testing as an outpatient.

 

Regarding his presumed diagnosis of pneumonia, he has already been 

started on antibiotics.

 

I thank you for the consultation.

 

 

__________________________________

MD NOÉ Ware/ARLEN

D: 2018, 02:59 PM

T: 2018, 03:43 PM

Visit #: [id number]

Job #: [id number]

## 2018-04-14 NOTE — RADRPT
EXAM DATE/TIME:  04/14/2018 15:51 

 

[facility] COMPARISON:     

CHEST SINGLE AP, April 13, 2018, 23:57.

 

 

INDICATIONS :     

Chest pain and shortness of breath for two days.

                      

 

IV CONTRAST:     

70 cc Omnipaque 350 (iohexol) IV 

 

 

RADIATION DOSE:     

28.17 CTDIvol (mGy) ; Patient positioning; Patient body habitus

 

 

MEDICAL HISTORY :     

Hypertension. Cardiovascular disease 

 

SURGICAL HISTORY :      

None. 

 

ENCOUNTER:      

Initial

 

ACUITY:      

2 days

 

PAIN SCALE:      

5/10

 

LOCATION:       

Bilateral chest 

 

TECHNIQUE:     

Volumetric scanning of the chest was performed using a pulmonary embolism protocol MIP images were re
constructed.  Using automated exposure control and adjustment of the mA and/or kV according to patien
t size, radiation dose was kept as low as reasonably achievable to obtain optimal diagnostic quality 
images.   DICOM format image data is available electronically for review and comparison.  

 

Follow-up recommendations for detected pulmonary nodules are based at a minimum on nodule size and pa
tient risk factors according to Fleischner Society Guidelines.

 

FINDINGS:     

 

PULMONARY ARTERIES: 

There is extensive bilateral pulmonary embolism with a thin saddle embolus straddling the pulmonary a
rtery bifurcation and extensive clot present in segmental and subsegmental vessels bilaterally.

 

LUNGS:     

There is airspace opacity in the posterolateral aspect of the right upper lobe and mild interstitial 
parenchymal opacity in the lung bases bilaterally.

 

PLEURAE:     

There is no pleural thickening or pleural effusion.

 

MEDIASTINUM:     

There is good visualization of the great vessels of the middle mediastinum.  No evidence of mediastin
al or hilar adenopathy/mass.

 

MUSCULOSKELETAL:     

Within normal limits for patient age.

 

MISCELLANEOUS:     

The visualized upper abdominal organs demonstrate no acute abnormality.

 

CONCLUSION:     

Extensive bilateral pulmonary embolism.

 

 

 

 

 Austen Olmos MD on April 14, 2018 at 16:15           

Board Certified Radiologist.

 This report was verified electronically.

## 2018-04-14 NOTE — RADRPT
EXAM DATE/TIME:  04/14/2018 00:02 

 

[facility] COMPARISON:     

No previous studies available for comparison.

 

 

INDICATIONS :     

Dizziness.

                      

 

RADIATION DOSE:     

56.35 CTDIvol (mGy) 

 

 

 

MEDICAL HISTORY :     

Hypertension.  

 

SURGICAL HISTORY :      

None. 

 

ENCOUNTER:      

Initial

 

ACUITY:      

1 day

 

PAIN SCALE:      

0/10

 

LOCATION:        

cranial 

 

TECHNIQUE:     

Multiple contiguous axial images were obtained of the head.  Using automated exposure control and adj
ustment of the mA and/or kV according to patient size, radiation dose was kept as low as reasonably a
chievable to obtain optimal diagnostic quality images.   DICOM format image data is available electro
nically for review and comparison.  

 

FINDINGS:     

Noncontrast axial head CT demonstrates the ventricles to be normal in size and configuration with a n
ormal sulcal pattern. No acute intracranial hemorrhage, acute cortical infarction, mass or midline sh
ift is seen. There is decreased density in the periventricular white matter consistent with small ves
merly vascular disease not unexpected in a patient of this age. Posterior fossa structures are unremark
able.

 

Bone windows are unremarkable.

 

CONCLUSION:     

1. No evidence of acute intracranial pathology. No masses are identified.

 

 

 

 David Armstrong MD on April 14, 2018 at 2:18           

Board Certified Radiologist.

 This report was verified electronically.

## 2018-04-15 VITALS
DIASTOLIC BLOOD PRESSURE: 66 MMHG | OXYGEN SATURATION: 98 % | HEART RATE: 50 BPM | TEMPERATURE: 97 F | SYSTOLIC BLOOD PRESSURE: 116 MMHG

## 2018-04-15 VITALS
HEART RATE: 56 BPM | OXYGEN SATURATION: 96 % | TEMPERATURE: 99.1 F | DIASTOLIC BLOOD PRESSURE: 78 MMHG | SYSTOLIC BLOOD PRESSURE: 165 MMHG

## 2018-04-15 VITALS
HEART RATE: 46 BPM | OXYGEN SATURATION: 96 % | DIASTOLIC BLOOD PRESSURE: 63 MMHG | SYSTOLIC BLOOD PRESSURE: 140 MMHG | TEMPERATURE: 97 F

## 2018-04-15 VITALS — HEART RATE: 58 BPM

## 2018-04-15 VITALS — HEART RATE: 47 BPM

## 2018-04-15 VITALS
SYSTOLIC BLOOD PRESSURE: 120 MMHG | DIASTOLIC BLOOD PRESSURE: 57 MMHG | OXYGEN SATURATION: 97 % | RESPIRATION RATE: 28 BRPM | TEMPERATURE: 98.2 F | HEART RATE: 50 BPM

## 2018-04-15 VITALS
SYSTOLIC BLOOD PRESSURE: 186 MMHG | DIASTOLIC BLOOD PRESSURE: 86 MMHG | HEART RATE: 54 BPM | OXYGEN SATURATION: 100 % | TEMPERATURE: 99.1 F

## 2018-04-15 VITALS
SYSTOLIC BLOOD PRESSURE: 106 MMHG | DIASTOLIC BLOOD PRESSURE: 61 MMHG | HEART RATE: 46 BPM | OXYGEN SATURATION: 98 % | TEMPERATURE: 98.3 F

## 2018-04-15 VITALS — HEART RATE: 62 BPM

## 2018-04-15 VITALS — OXYGEN SATURATION: 100 %

## 2018-04-15 VITALS — HEART RATE: 63 BPM

## 2018-04-15 VITALS — OXYGEN SATURATION: 99 %

## 2018-04-15 VITALS — HEART RATE: 69 BPM

## 2018-04-15 VITALS — HEART RATE: 65 BPM

## 2018-04-15 LAB
ALBUMIN SERPL-MCNC: 2.4 GM/DL (ref 3.4–5)
ALP SERPL-CCNC: 66 U/L (ref 45–117)
ALT SERPL-CCNC: 32 U/L (ref 12–78)
AST SERPL-CCNC: 26 U/L (ref 15–37)
BASOPHILS # BLD AUTO: 0 TH/MM3 (ref 0–0.2)
BASOPHILS NFR BLD: 0.4 % (ref 0–2)
BILIRUB SERPL-MCNC: 0.6 MG/DL (ref 0.2–1)
BUN SERPL-MCNC: 25 MG/DL (ref 7–18)
CALCIUM SERPL-MCNC: 8.3 MG/DL (ref 8.5–10.1)
CHLORIDE SERPL-SCNC: 106 MEQ/L (ref 98–107)
COLOR UR: YELLOW
CREAT SERPL-MCNC: 1.02 MG/DL (ref 0.6–1.3)
EOSINOPHIL # BLD: 0.1 TH/MM3 (ref 0–0.4)
EOSINOPHIL NFR BLD: 0.5 % (ref 0–4)
ERYTHROCYTE [DISTWIDTH] IN BLOOD BY AUTOMATED COUNT: 14.5 % (ref 11.6–17.2)
GFR SERPLBLD BASED ON 1.73 SQ M-ARVRAT: 70 ML/MIN (ref 89–?)
GLUCOSE SERPL-MCNC: 115 MG/DL (ref 74–106)
GLUCOSE UR STRIP-MCNC: (no result) MG/DL
HCO3 BLD-SCNC: 24.7 MEQ/L (ref 21–32)
HCT VFR BLD CALC: 35 % (ref 39–51)
HCT VFR BLD CALC: 35.4 % (ref 39–51)
HGB BLD-MCNC: 11.8 GM/DL (ref 13–17)
HGB BLD-MCNC: 11.9 GM/DL (ref 13–17)
HGB UR QL STRIP: (no result)
KETONES UR STRIP-MCNC: (no result) MG/DL
LYMPHOCYTES # BLD AUTO: 1.5 TH/MM3 (ref 1–4.8)
LYMPHOCYTES NFR BLD AUTO: 13.3 % (ref 9–44)
MCH RBC QN AUTO: 31.5 PG (ref 27–34)
MCHC RBC AUTO-ENTMCNC: 33.8 % (ref 32–36)
MCV RBC AUTO: 93.4 FL (ref 80–100)
MONOCYTE #: 0.8 TH/MM3 (ref 0–0.9)
MONOCYTES NFR BLD: 7.3 % (ref 0–8)
MUCOUS THREADS #/AREA URNS LPF: (no result) /LPF
NEUTROPHILS # BLD AUTO: 8.7 TH/MM3 (ref 1.8–7.7)
NEUTROPHILS NFR BLD AUTO: 78.5 % (ref 16–70)
NITRITE UR QL STRIP: (no result)
PHOSPHATE SERPL-MCNC: 3.4 MG/DL (ref 2.5–4.9)
PLATELET # BLD: 147 TH/MM3 (ref 150–450)
PMV BLD AUTO: 7.8 FL (ref 7–11)
PROT SERPL-MCNC: 6.5 GM/DL (ref 6.4–8.2)
RBC # BLD AUTO: 3.75 MIL/MM3 (ref 4.5–5.9)
SODIUM SERPL-SCNC: 138 MEQ/L (ref 136–145)
SP GR UR STRIP: 1.04 (ref 1–1.03)
SQUAMOUS #/AREA URNS HPF: 1 /HPF (ref 0–5)
T4 FREE SERPL-MCNC: 1.14 NG/DL (ref 0.76–1.46)
URINE LEUKOCYTE ESTERASE: (no result)
WBC # BLD AUTO: 11 TH/MM3 (ref 4–11)

## 2018-04-15 RX ADMIN — ENOXAPARIN SODIUM SCH MG: 100 INJECTION SUBCUTANEOUS at 17:06

## 2018-04-15 RX ADMIN — SODIUM CHLORIDE SCH MLS/HR: 900 INJECTION INTRAVENOUS at 22:15

## 2018-04-15 RX ADMIN — FAMOTIDINE SCH MG: 20 TABLET, FILM COATED ORAL at 09:13

## 2018-04-15 RX ADMIN — FAMOTIDINE SCH MG: 20 TABLET, FILM COATED ORAL at 21:01

## 2018-04-15 RX ADMIN — STANDARDIZED SENNA CONCENTRATE AND DOCUSATE SODIUM SCH TAB: 8.6; 5 TABLET, FILM COATED ORAL at 21:01

## 2018-04-15 RX ADMIN — LOSARTAN POTASSIUM SCH MG: 50 TABLET, FILM COATED ORAL at 09:09

## 2018-04-15 RX ADMIN — DOCUSATE SODIUM SCH MG: 100 CAPSULE, LIQUID FILLED ORAL at 21:01

## 2018-04-15 RX ADMIN — AZITHROMYCIN SCH MLS/HR: 500 INJECTION, POWDER, LYOPHILIZED, FOR SOLUTION INTRAVENOUS at 22:49

## 2018-04-15 RX ADMIN — Medication SCH ML: at 09:10

## 2018-04-15 RX ADMIN — TAMSULOSIN HYDROCHLORIDE SCH MG: 0.4 CAPSULE ORAL at 21:01

## 2018-04-15 RX ADMIN — STANDARDIZED SENNA CONCENTRATE AND DOCUSATE SODIUM SCH TAB: 8.6; 5 TABLET, FILM COATED ORAL at 09:00

## 2018-04-15 RX ADMIN — DOCUSATE SODIUM SCH MG: 100 CAPSULE, LIQUID FILLED ORAL at 09:09

## 2018-04-15 RX ADMIN — Medication SCH ML: at 21:01

## 2018-04-15 RX ADMIN — ALLOPURINOL SCH MG: 300 TABLET ORAL at 09:09

## 2018-04-15 RX ADMIN — ENOXAPARIN SODIUM SCH MG: 100 INJECTION SUBCUTANEOUS at 05:01

## 2018-04-15 RX ADMIN — PRAVASTATIN SODIUM SCH MG: 80 TABLET ORAL at 21:01

## 2018-04-15 NOTE — EKG
Date Performed: 04/13/2018       Time Performed: 23:18:54

 

PTAGE:      82 years

 

EKG:      Atrial fibrillation. There is a couplet pattern. Patterning consistent either with couplets
 or atrial fibrillation with aberrency. ABNORMAL ECG 

 

NO PREVIOUS TRACING            

 

DOCTOR:   Adwoa Reyes  Interpretating Date/Time  04/15/2018 15:42:20

## 2018-04-15 NOTE — EKG
Date Performed: 04/14/2018       Time Performed: 06:00:22

 

PTAGE:      82 years

 

EKG:      ATRIAL FIBRILLATION WITH CONTROLLED VENTRICULAR RATE AND WIDE COMPLEX BEATS OR PVC Compared
 to previous tracing, the wide complex beats are less frequent. ABNORMAL ECG

 

PREVIOUS TRACING       : 04/13/2018 23.18.54

 

DOCTOR:   Adwoa Reyes  Interpretating Date/Time  04/15/2018 15:43:21

## 2018-04-15 NOTE — HHI.CCPN
Subjective


Remarks/Hospital Course


Hospital Course:





82-year-old  male with past medical history of hypertension, 

hyperlipidemia, obesity, remote tobacco abuse who [place] [facility] [place] 

evening of 4/13 after a syncopal event.  Reportedly patient was in the bathroom 

and was short of breath and then became unresponsive and was lowered to the 

ground by his son. No seizure or head trauma.. [facility] was called and found him 

diaphoretic, pale, in bigeminy. He was given lidocaine and then was in  A fib. 

Troponin was normal.  CXR showed RLL opacity and he as given ceftriaxone and  

azithromycin.  He was admitted to hospitalist for further workup.  CT chest was 

ordered by Dr. Panda and demonstrates extensive bilateral pulmonary emboli. 

There is a saddle embolus component. He has received Lovenox 100 mg IV.  Dr. Gómez has consulted [facility] regarding possible fibrinolytic therapy.





Patient state he has noticed dyspnea on exertion about 2 months. He states he 

had previously had an EKG in August 2017 and had been told that he had atrial 

fibrillation.  He was told to follow-up with cardiology but did not. A week ago 

he was at PCP (Dr. Joshua) and was referred to Dr. Panda. Patient states 

that for the last 2 weeks he has had progressive worsening SOB. Over the last 2 

days he has been SOB with ambulating a few feet. He has had some cough, 

occasionally productive of blood tinged sputum. No peripheral edema. He has had 

some pain in his right posterior thorax, intermittent, pleuritic. No chest pain 

with exertion. No palpitations. No prior h/o VTE, no recent surgery, no known 

malignancy, no trauma.   No h/o stroke/TIA , ICH, trauma, brain/spinal lesions, 

GI or other bleeding, bleeding diathesis, recent surgery, spinal intervention, 

prior anticoagulant therapy, retinopathy or other absolute contraindication for 

fibrinolytic.   Serial troponins have been normal.  . He has been 

normotensive/hypertensive.  Upon initial discussion of risk/benefits of 

fibrinolytic therapy for submassive PE (including relative contraindications of 

age and possibly duration of symptoms), patient expresses interest in pursuing 

TPA so contacted Dr. Carrizales who is on call for Echo and obtaining stat 

Echo to evaluate R heart strain. Extensive discussion with patient, wife, son 

and grandson.





Subjective:





4/15: improving symptoms. hgb dropped from 13 --> 11 today. will recheck. ROS 

otherwise negative. on 3L o2 by NC.





Objective





Vital Signs








  Date Time  Temp Pulse Resp B/P (MAP) Pulse Ox O2 Delivery O2 Flow Rate FiO2


 


4/15/18 06:00  47      


 


4/15/18 04:00 99.1   186/86 (119) 100   


 


4/15/18 03:00      Nasal Cannula 3.00 


 


4/14/18 18:15   20     














Intake and Output   


 


 4/15/18 4/15/18 4/16/18





 08:00 16:00 00:00


 


Intake Total 960 ml  


 


Output Total 1485 ml  


 


Balance -525 ml  








Result Diagram:  


4/15/18 0540                                                                   

             4/15/18 0540





Objective Remarks


GENERAL: elderly patient who is sitting up in bed on nasal cannula.  He is 

alert and interactive


SKIN: Warm and dry, well-perfused without bruising


HEAD: Atraumatic. Normocephalic. 


EYES: Pupils equal and round. No scleral icterus. No injection or drainage. 


ENT: No nasal bleeding or discharge.  Mucous membranes pink and moist.


NECK: Trachea midline.  Thick neck, no JVD appreciated.


CARDIOVASCULAR: Irregularly irregular with rate in the 60s. afib by tele.


RESPIRATORY: unlabored this morning. in no distress. Without accessory muscle 

use. 


GASTROINTESTINAL: Abdomen soft, protuberant, non-tender, nondistended.  


MUSCULOSKELETAL: Extremities without clubbing, cyanosis, or edema. No obvious 

deformities.


NEUROLOGICAL: Awake and alert, oriented 4. No obvious cranial nerve deficits.





A/P


Problem List:  


(1) Systolic heart failure, chronic


ICD Code:  I50.22 - Chronic systolic (congestive) heart failure


Status:  Chronic


(2) Atrial fibrillation


ICD Code:  I48.91 - Unspecified atrial fibrillation


Status:  Chronic


(3) Pulmonary embolism, bilateral


ICD Code:  I26.99 - Other pulmonary embolism without acute cor pulmonale


Status:  Acute


(4) Obesity (BMI 30-39.9)


ICD Code:  E66.9 - Obesity, unspecified


Status:  Chronic


(5) BPH (benign prostatic hyperplasia)


ICD Code:  N40.0 - Benign prostatic hyperplasia without lower urinary tract 

symptoms


Status:  Chronic


(6) Hematuria


ICD Code:  R31.9 - Hematuria, unspecified


Status:  Acute


Permanent Comment:  post - TPA  Last Edited By: Nevaeh Gifford on Apr 15, 2018 05:

53


(7) Hemoptysis


ICD Code:  R04.2 - Hemoptysis


Status:  Acute


(8) History of tobacco abuse


ICD Code:  Z87.891 - Personal history of nicotine dependence


Status:  Chronic


(9) DVT, bilateral lower limbs


ICD Code:  I82.403 - Acute embolism and thrombosis of unspecified deep veins of 

lower extremity, bilateral


Status:  Acute


Assessment and Plan


Assessment: 82yM with submassive pulmonary embolism s/p 50mg TPA on 4/14. 

clinically stable. will trend hgb. keep bedrest until 24h post TPA. continue 

anticoagulation. mild hematuria clearing up. keep in ICU at least 1 more day. 





NEURO:


CT brain 4/14 - negative for acute intracranial abnormality. 





RESP:


Dyspnea


Prior tobacco abuse


NC wean as tolerated. IS q1 hour awake. 





CV:


Submassive PE with Right heart strain 


Chronic systolic heart failure. 


Syncope 


Atrial fibrillation, rate controlled


Hemoptysis - suspect secondary to PE


Hypertension


Hyperlipidemia





CTPA with saddle pulmonary embolism, bilateral segmental and subsegmental PE. 


He has been normotensive on NC.  Troponin negative, . 


Stat Echo obtained and discussed with Dr. Carrizales. EF 35-40%. RV dilated 

and hypokinetic. RSVP 67


Although he has been hemodynamically stable in the hospital, this is a high 

risk PE. Discussed in detail risk/ benefit of TPA 50 mg IV dose for submassive 

PE. Patient aware of relative contraindication of age >80. Also discussed that 

because of duration of symptoms, if the RV strain has been more long standing, 

there may be less benefit from fibrinolytic. He may have had multiple subacute 

PE with acute submassive event yesterday evening when he had syncope.  Hope is 

that aggressive treatment of this clot burden can decrease pulmonary htn, lower 

risk of recurrent PE, produce improvement of symptoms and activity tolerance. 

Discussed 1-3% risk of ICH and ~10% risk of hemorrhage when including 

extracranial.  


Patient places high value on quality of life and feels he would want aggressive 

therapy to mitigate consequences of pulmonary HTN and RV dysfunction. He is 

accepting risks of hemorrhage and has discussed this with his wife and children 

who are in agreement. 


Has received  Lovenox, will continue concomitantly per MOPPETT trial 

methodology.  Transfer to ICU. Give TPA 10 mg IV with 40 mg IV over 2 hours.  

Monitor for bleeding.


In view of RV dysfunction and acute PE, will hold beta blocker at this time.  

Beta blocker will be needed long term for CHF once stabilized from PE.  Resume 

losartan 50 mg po daily. 


Hold aspirin for now following TPA


Cardiology following, Dr. Panda.


Continue simvastatin 40 mg p.o. nightly


Although patient denied prior Echo, I later discovered in [facility] system 8/29/ 17  that showed global hypokinesis EF 40-45%, biatrial enlargement. Moderate MR/

TR, mod pulmonary HTN.  





GI:


Obesity


Heart healthy diet





FEN/RENAL:


Urinary retention


BPH


Patient had post void residual of 950.  Had I/O cath for this. In view of  the 

fact we are proceeding with fibrinolytics, will Place Solano.


Continue flomax 0.4 mg p.o. nightly





ID: 


Community acquired pneumonia 


On Rocephin and azithromycin 4/14 #2.  Follow-up blood cultures.  Initial 

urinalysis upon insertion of [facility] catheter has pyuria.  We will follow-up 

urine culture.





HEME:


Submassive pulmonary embolus


Bilateral lower extremity DVT (right posterior tibial nonocclusive thrombus.  

Occlusive thrombus left peroneal vein.  Nonocclusive thrombus left popliteal 

and posterior tibial)


No clear provoking factor. Will consult hematology for recommendations 

regarding workup and for followup. 





ENDO:


Monitor glucose.  Start insulin sliding scale if needed


TSH normal





MSK:


Gout 


Continue with allopurinol 300 mg p.o. daily





PROPH: Lovenox as per above for DVT treatment.  





ACCESS: PIV providing adequate access at this time. No IV sticks x24 hours.  

Famotidine for stress ulcer prophylactic





Full code











Vladimir Chang MD Apr 15, 2018 11:13

## 2018-04-16 VITALS
TEMPERATURE: 97.9 F | OXYGEN SATURATION: 97 % | SYSTOLIC BLOOD PRESSURE: 180 MMHG | DIASTOLIC BLOOD PRESSURE: 88 MMHG | HEART RATE: 57 BPM | RESPIRATION RATE: 18 BRPM

## 2018-04-16 VITALS
RESPIRATION RATE: 18 BRPM | OXYGEN SATURATION: 94 % | HEART RATE: 63 BPM | DIASTOLIC BLOOD PRESSURE: 97 MMHG | SYSTOLIC BLOOD PRESSURE: 184 MMHG | TEMPERATURE: 97.4 F

## 2018-04-16 VITALS
OXYGEN SATURATION: 100 % | SYSTOLIC BLOOD PRESSURE: 127 MMHG | TEMPERATURE: 97.5 F | DIASTOLIC BLOOD PRESSURE: 67 MMHG | RESPIRATION RATE: 15 BRPM | HEART RATE: 38 BPM

## 2018-04-16 VITALS
SYSTOLIC BLOOD PRESSURE: 109 MMHG | OXYGEN SATURATION: 99 % | HEART RATE: 55 BPM | TEMPERATURE: 98.7 F | DIASTOLIC BLOOD PRESSURE: 69 MMHG | RESPIRATION RATE: 26 BRPM

## 2018-04-16 VITALS
SYSTOLIC BLOOD PRESSURE: 168 MMHG | DIASTOLIC BLOOD PRESSURE: 86 MMHG | OXYGEN SATURATION: 99 % | RESPIRATION RATE: 21 BRPM | TEMPERATURE: 98.9 F | HEART RATE: 74 BPM

## 2018-04-16 VITALS
DIASTOLIC BLOOD PRESSURE: 60 MMHG | HEART RATE: 73 BPM | SYSTOLIC BLOOD PRESSURE: 128 MMHG | OXYGEN SATURATION: 97 % | RESPIRATION RATE: 18 BRPM | TEMPERATURE: 97.3 F

## 2018-04-16 VITALS — HEART RATE: 44 BPM

## 2018-04-16 VITALS — OXYGEN SATURATION: 96 %

## 2018-04-16 VITALS — HEART RATE: 66 BPM

## 2018-04-16 LAB
BUN SERPL-MCNC: 19 MG/DL (ref 7–18)
CALCIUM SERPL-MCNC: 8.6 MG/DL (ref 8.5–10.1)
CHLORIDE SERPL-SCNC: 108 MEQ/L (ref 98–107)
CREAT SERPL-MCNC: 0.89 MG/DL (ref 0.6–1.3)
ERYTHROCYTE [DISTWIDTH] IN BLOOD BY AUTOMATED COUNT: 14.6 % (ref 11.6–17.2)
GFR SERPLBLD BASED ON 1.73 SQ M-ARVRAT: 82 ML/MIN (ref 89–?)
GLUCOSE SERPL-MCNC: 102 MG/DL (ref 74–106)
HCO3 BLD-SCNC: 22.9 MEQ/L (ref 21–32)
HCT VFR BLD CALC: 35.5 % (ref 39–51)
HGB BLD-MCNC: 12.4 GM/DL (ref 13–17)
MCH RBC QN AUTO: 32.5 PG (ref 27–34)
MCHC RBC AUTO-ENTMCNC: 34.9 % (ref 32–36)
MCV RBC AUTO: 93.1 FL (ref 80–100)
PLATELET # BLD: 183 TH/MM3 (ref 150–450)
PMV BLD AUTO: 8 FL (ref 7–11)
RBC # BLD AUTO: 3.82 MIL/MM3 (ref 4.5–5.9)
SODIUM SERPL-SCNC: 139 MEQ/L (ref 136–145)
WBC # BLD AUTO: 9.6 TH/MM3 (ref 4–11)

## 2018-04-16 RX ADMIN — FAMOTIDINE SCH MG: 20 TABLET, FILM COATED ORAL at 21:50

## 2018-04-16 RX ADMIN — ALLOPURINOL SCH MG: 300 TABLET ORAL at 09:25

## 2018-04-16 RX ADMIN — STANDARDIZED SENNA CONCENTRATE AND DOCUSATE SODIUM SCH TAB: 8.6; 5 TABLET, FILM COATED ORAL at 09:00

## 2018-04-16 RX ADMIN — Medication SCH ML: at 21:00

## 2018-04-16 RX ADMIN — ENOXAPARIN SODIUM SCH MG: 100 INJECTION SUBCUTANEOUS at 03:57

## 2018-04-16 RX ADMIN — APIXABAN SCH MG: 5 TABLET, FILM COATED ORAL at 21:51

## 2018-04-16 RX ADMIN — APIXABAN SCH MG: 5 TABLET, FILM COATED ORAL at 09:25

## 2018-04-16 RX ADMIN — TAMSULOSIN HYDROCHLORIDE SCH MG: 0.4 CAPSULE ORAL at 23:02

## 2018-04-16 RX ADMIN — PRAVASTATIN SODIUM SCH MG: 80 TABLET ORAL at 21:50

## 2018-04-16 RX ADMIN — FAMOTIDINE SCH MG: 20 TABLET, FILM COATED ORAL at 09:25

## 2018-04-16 RX ADMIN — LOSARTAN POTASSIUM SCH MG: 50 TABLET, FILM COATED ORAL at 09:25

## 2018-04-16 RX ADMIN — STANDARDIZED SENNA CONCENTRATE AND DOCUSATE SODIUM SCH TAB: 8.6; 5 TABLET, FILM COATED ORAL at 21:00

## 2018-04-16 RX ADMIN — Medication SCH ML: at 09:00

## 2018-04-16 NOTE — MB
cc:

Jean Toro MD

****

 

 

DATE:

04/15/2018

 

REASON FOR CONSULTATION:

The patient with diagnosis of extensive pulmonary emboli and DVT.

 

HISTORY OF PRESENT ILLNESS:

This is an 82-year-old male who has a history of hypertension and 

hyperlipidemia, who has been becoming progressively short of breath 

over the past 4-5 months.  He was found to have atrial fibrillation 

and was recently seen by his cardiologist.  He was brought to the 

emergency room with dizziness and shortness of breath.  He became 

lightheaded and he had a presyncopal episode.  [facility] was called and he 

was found to have bigeminy.  In the emergency room, upon arrival, the 

patient underwent a CT angiogram which showed extensive bilateral 

pulmonary emboli, with a thin saddle embolus straddling the pulmonary 

artery bifurcation and extensive clot presentation in the segmental 

and subsegmental vessels bilaterally.  Subsequently, he had a lower 

extremity Doppler ultrasound as well, which showed bilateral occlusive

thrombi in the left peroneal vein.  There was also a nonocclusive 

thrombi in the left popliteal and posterior tibial vein.  There was 

also a nonocclusive thrombus in the right posterior tibial vein.  The 

patient was started on heparin GTT.  The patient has received TPA.  He

is currently asymptomatic and denies any chest pain.  He denies any 

heart palpitations.  He is awake and alert and he is eating his 

dinner.  He states that he has been driving to [place] every month.  

He stated that he does take frequent breaks during his car drives.  He

is not on any testosterone therapy.  He has not had any lower 

extremity trauma.  He states that he used to smoke cigarettes as a 

teenager, but has not touch a cigarette for 60 years.  He rarely 

drinks alcohol.  He retired from the [employer] and he was in 

the [employer].  He is a professor at [employer].  He 

does not have any family members who have had blood clots, including 

DVTs or pulmonary embolisms.

 

REVIEW OF SYSTEMS:

A comprehensive review of system was completed which is negative 

except as described in the HPI.

 

PAST MEDICAL HISTORY:

Hypertension, hyperlipidemia, obesity, BPH, gout, vitamin D 

deficiency, chronic back pain.

 

PAST SURGICAL HISTORY:

History of cataract surgery 3-4 years ago, history of knee 

replacement.

 

FAMILY HISTORY:

Was reviewed and is noncontributory to this admission.

 

SOCIAL HISTORY:

He is .  His lives with his wife.  He does not smoke 

cigarettes.  He retired from the  and also worked for the 

ice.

 

MEDICATIONS:

Losartan 50 mg p.o. b.i.d., hydralazine 10 mg p.r.n., Colace 100 mg 

p.o. b.i.d., losartan 50 mg p.o. daily, allopurinol 300 mg p.o. daily,

Pepcid 20 mg p.o. daily, ceftriaxone, azithromycin, tamsulosin, 

pravastatin, Lovenox 100 mg subcutaneous b.i.d., Zofran, Tylenol, 

Norco 5/325 p.r.n., morphine 2 mg IV every 3 hours p.r.n., Milk of 

Magnesia p.r.n., senna p.r.n., bisacodyl p.r.n., lactulose p.r.n.

 

ALLERGIES:

NO KNOWN DRUG ALLERGIES.

 

PHYSICAL EXAMINATION:

VITAL SIGNS:  Blood pressure is 106/61, pulse in the 40s, temperature 

is 98.3, O2 saturations are 98% on 3 liters of nasal cannula.

GENERAL:  Obese, elderly male in no apparent distress.

HEENT:  Pupils are equal, round, reactive to light.  EOMI.  No oral 

thrush.  No lesions.

NECK:  Supple.  No JVD.  No bruits.  No lymphadenopathy.

CHEST:  Clear to auscultation bilaterally.

CARDIAC:  S1, S2.  Regular rate and rhythm.  He did have atrial 

fibrillation, but currently his rate and rhythm is regular.  Heart 

rate is in the 60s-70s.

ABDOMEN:  Soft, nontender, nondistended.  Bowel sounds are present.

EXTREMITIES:  Without any edema, erythema or cyanosis.

SKIN:  Without any petechiae, lesions or bruises.

NEUROLOGIC:  No focal deficits.

PSYCHIATRIC:  Mood and affect is appropriate.

 

LABORATORY DATA:

WBC 11, hemoglobin 11.8, platelet count 147.  Serum chemistries, 

sodium 138, potassium 3.9, chloride 106, BUN 25, creatinine 1.02, 

calcium 8.3, total bilirubin 0.6, AST 26, ALT 32, alkaline phosphatase

66, total protein 6.5, albumin is 2.4.  TSH is 1.04.

 

IMAGING:

Was reviewed in the EMR.  Discussed in the HPI.  He also had a chest 

x-ray which showed cardiomegaly and there was concern for pneumonia in

the right upper lobe.

 

ASSESSMENT AND PLAN:

This is an 82-year-old male who is becoming progressively short of 

breath.  He was recently found to have atrial fibrillation.  He was 

now brought to the emergency room with episode of presyncope.  Imaging

revealed large pulmonary embolus and deep venous thrombosis.

1.  Extensive bilateral pulmonary embolism with a thin saddle embolus 

straddling the pulmonary artery bifurcation and extensive clot in the 

segmental and subsegmental vessels.  He also has bilateral lower 

extremity DVT.  These blood clots have occurred in an unprovoked 

situation.  However, he does frequently travel by car to [place], but

states that he takes regular breaks.  Given the extent of pulmonary 

embolism, he will need to be on anticoagulation indefinitely.  I would

recommend converting him to Eliquis 10 mg p.o. b.i.d. x  _____ days 

and then he will be on Eliquis 5 mg p.o. b.i.d.  Based on his family 

history, he does not have any family history of PE or DVT, we could 

obtain a hypercoagulable workup in the future.  This can be completed 

in the outpatient setting.  He can followup in the hematology clinic 

4-5 weeks after his hospital discharge.

2.  Atrial fibrillation, currently rate controlled.  He is currently 

being seen by cardiology.

3.  Community-acquired pneumonia.  He is currently on antibiotics.

4.  History of benign prostatic hypertrophy and urinary retention.  He

had a Solano in place.  He is currently on Flomax.

5.  History of gout, currently on allopurinol 300 mg p.o. daily.

 

Thank you for allowing me to participate in the care of this patient. 

I will continue to follow this patient along.

 

 

__________________________________

MD ANGIE Garcia/ANDRIY

D: 04/15/2018, 07:21 PM

T: 04/15/2018, 07:51 PM

Visit #: [id number]

Job #: [id number]

## 2018-04-16 NOTE — HHI.CCPN
Subjective


Remarks/Hospital Course


Hospital Course:





82-year-old  male with past medical history of hypertension, 

hyperlipidemia, obesity, remote tobacco abuse who presented to [facility] ED the 

evening of 4/13 after a syncopal event.  Reportedly patient was in the bathroom 

and was short of breath and then became unresponsive and was lowered to the 

ground by his son. No seizure or head trauma.. [facility] was called and found him 

diaphoretic, pale, in bigeminy. He was given lidocaine and then was in  A fib. 

Troponin was normal.  CXR showed RLL opacity and he as given ceftriaxone and  

azithromycin.  He was admitted to hospitalist for further workup.  CT chest was 

ordered by Dr. Panda and demonstrates extensive bilateral pulmonary emboli. 

There is a saddle embolus component. He has received Lovenox 100 mg IV.  Dr. Gómez has consulted [facility] regarding possible fibrinolytic therapy.





Patient state he has noticed dyspnea on exertion about 2 months. He states he 

had previously had an EKG in August 2017 and had been told that he had atrial 

fibrillation.  He was told to follow-up with cardiology but did not. A week ago 

he was at PCP (Dr. Joshua) and was referred to Dr. Panda. Patient states 

that for the last 2 weeks he has had progressive worsening SOB. Over the last 2 

days he has been SOB with ambulating a few feet. He has had some cough, 

occasionally productive of blood tinged sputum. No peripheral edema. He has had 

some pain in his right posterior thorax, intermittent, pleuritic. No chest pain 

with exertion. No palpitations. No prior h/o VTE, no recent surgery, no known 

malignancy, no trauma.   No h/o stroke/TIA , ICH, trauma, brain/spinal lesions, 

GI or other bleeding, bleeding diathesis, recent surgery, spinal intervention, 

prior anticoagulant therapy, retinopathy or other absolute contraindication for 

fibrinolytic.   Serial troponins have been normal.  . He has been 

normotensive/hypertensive.  Upon initial discussion of risk/benefits of 

fibrinolytic therapy for submassive PE (including relative contraindications of 

age and possibly duration of symptoms), patient expresses interest in pursuing 

TPA so contacted Dr. Carrizales who is on call for Echo and obtaining stat 

Echo to evaluate R heart strain. Extensive discussion with patient, wife, son 

and grandson.





Subjective:





4/15: improving symptoms. hgb dropped from 13 --> 11 today. will recheck. ROS 

otherwise negative. on 3L o2 by NC. 





4/16: on room air. symptoms continue to improve. hgb stable. denies complaints. 

sob improving subjectively. urine is becoming more clear, but slightly 

hematuria persists.





Objective





Vital Signs








  Date Time  Temp Pulse Resp B/P (MAP) Pulse Ox O2 Delivery O2 Flow Rate FiO2


 


4/16/18 08:00 97.5 38 15 127/67 (87) 100   


 


4/16/18 07:00      Room Air  


 


4/16/18 03:00       3.00 














Intake and Output   


 


 4/16/18 4/16/18 4/17/18





 08:00 16:00 00:00


 


Intake Total 240 ml  


 


Output Total 1000 ml  


 


Balance -760 ml  








Result Diagram:  


4/16/18 0602 4/16/18 0602





Objective Remarks


GENERAL: elderly patient who is sitting up in bed on room air.  He is alert and 

interactive


SKIN: Warm and dry, well-perfused without bruising


HEAD: Atraumatic. Normocephalic. 


EYES: Pupils equal and round. No scleral icterus. No injection or drainage. 


ENT: No nasal bleeding or discharge.  Mucous membranes pink and moist.


NECK: Trachea midline.  Thick neck, no JVD appreciated.


CARDIOVASCULAR: Irregularly irregular with rate in the 50s. afib by tele.


RESPIRATORY: unlabored this morning. in no distress. Without accessory muscle 

use. 


GASTROINTESTINAL: Abdomen soft, protuberant, non-tender, nondistended.  


MUSCULOSKELETAL: Extremities without clubbing, cyanosis, or edema. No obvious 

deformities.


NEUROLOGICAL: Awake and alert, oriented 4. No obvious cranial nerve deficits.





A/P


Problem List:  


(1) Systolic heart failure, chronic


ICD Code:  I50.22 - Chronic systolic (congestive) heart failure


Status:  Chronic


(2) Atrial fibrillation


ICD Code:  I48.91 - Unspecified atrial fibrillation


Status:  Chronic


(3) Pulmonary embolism, bilateral


ICD Code:  I26.99 - Other pulmonary embolism without acute cor pulmonale


Status:  Acute


(4) Obesity (BMI 30-39.9)


ICD Code:  E66.9 - Obesity, unspecified


Status:  Chronic


(5) BPH (benign prostatic hyperplasia)


ICD Code:  N40.0 - Benign prostatic hyperplasia without lower urinary tract 

symptoms


Status:  Chronic


(6) Hematuria


ICD Code:  R31.9 - Hematuria, unspecified


Status:  Acute


Permanent Comment:  post - TPA  Last Edited By: Nevaeh Gifford on Apr 15, 2018 05:

53


(7) Hemoptysis


ICD Code:  R04.2 - Hemoptysis


Status:  Acute


(8) History of tobacco abuse


ICD Code:  Z87.891 - Personal history of nicotine dependence


Status:  Chronic


(9) DVT, bilateral lower limbs


ICD Code:  I82.403 - Acute embolism and thrombosis of unspecified deep veins of 

lower extremity, bilateral


Status:  Acute


Assessment and Plan


Assessment: 82yM with submassive pulmonary embolism s/p 50mg TPA on 4/14. 

clinically stable. OOB with assistance. PT/OT consults. stable for transfer out 

of ICU. will consult hospitalist service. have discussed care with cardiologist

, and his preference is Eliquis for long-term anticoagulation, so we will plan 

to start this today and transition off lovenox. 





NEURO:


CT brain 4/14 - negative for acute intracranial abnormality. 





RESP:


Dyspnea


Prior tobacco abuse


NC wean as tolerated. IS q1 hour awake. 





CV:


Submassive PE with Right heart strain 


Chronic systolic heart failure. 


Syncope 


Atrial fibrillation, rate controlled


Hemoptysis - suspect secondary to PE


Hypertension


Hyperlipidemia





CTPA with saddle pulmonary embolism, bilateral segmental and subsegmental PE. 


He has been normotensive on NC.  Troponin negative, . 


Stat Echo obtained and discussed with Dr. Carrizales. EF 35-40%. RV dilated 

and hypokinetic. RSVP 67


Although he has been hemodynamically stable in the hospital, this is a high 

risk PE. Discussed in detail risk/ benefit of TPA 50 mg IV dose for submassive 

PE. Patient aware of relative contraindication of age >80. Also discussed that 

because of duration of symptoms, if the RV strain has been more long standing, 

there may be less benefit from fibrinolytic. He may have had multiple subacute 

PE with acute submassive event yesterday evening when he had syncope.  Hope is 

that aggressive treatment of this clot burden can decrease pulmonary htn, lower 

risk of recurrent PE, produce improvement of symptoms and activity tolerance. 

Discussed 1-3% risk of ICH and ~10% risk of hemorrhage when including 

extracranial.  


Patient places high value on quality of life and feels he would want aggressive 

therapy to mitigate consequences of pulmonary HTN and RV dysfunction. He is 

accepting risks of hemorrhage and has discussed this with his wife and children 

who are in agreement. 


Has received  Lovenox, will continue concomitantly per MOPPETT trial 

methodology.  Transfer to ICU. Give TPA 10 mg IV with 40 mg IV over 2 hours.  

Monitor for bleeding.


In view of RV dysfunction and acute PE, will hold beta blocker at this time.  

Beta blocker will be needed long term for CHF once stabilized from PE.  

losartan 50 mg po daily. 


Hold aspirin for now following TPA


Cardiology following, Dr. Panda.


Continue simvastatin 40 mg p.o. nightly


Although patient denied prior Echo, I later discovered in [employer] system 8/29/ 17  that showed global hypokinesis EF 40-45%, biatrial enlargement. Moderate MR/

TR, mod pulmonary HTN.  





GI:


Obesity


Heart healthy diet





FEN/RENAL:


Urinary retention


BPH


Patient had post void residual of 950.  Had I/O cath for this. In view of  the 

fact we are proceeding with fibrinolytics, will Place Solano.


Continue flomax 0.4 mg p.o. nightly





ID: 


Community acquired pneumonia 


On Rocephin and azithromycin 4/14 #3. d/c today. cultures NGTD x 48h.





HEME:


Submassive pulmonary embolus


Bilateral lower extremity DVT (right posterior tibial nonocclusive thrombus.  

Occlusive thrombus left peroneal vein.  Nonocclusive thrombus left popliteal 

and posterior tibial)


No clear provoking factor. Will consult hematology for recommendations 

regarding workup and for followup. 


transition off lovenox to Eliquis 10mg po BID x 7 days, then 5mg BID.





ENDO:


Monitor glucose.  Start insulin sliding scale if needed


TSH normal





MSK:


Gout 


Continue with allopurinol 300 mg p.o. daily





PROPH: eliquis as above. 





ACCESS: PIV providing adequate access at this time.   Famotidine for stress 

ulcer prophylactic





Full code











Vladimir Chang MD Apr 16, 2018 08:43

## 2018-04-16 NOTE — PD.ONC.PN
Subjective


Subjective


Remarks


Afebrile overnight. 


Patient resting in bed in nad. 


states he is breathing much better than when he entered the hospital. 


tolerating eliquis.





Objective


Data











  Date Time  Temp Pulse Resp B/P (MAP) Pulse Ox O2 Delivery O2 Flow Rate FiO2


 


4/16/18 11:00     94 Room Air  


 


4/16/18 09:05     99   21


 


4/16/18 08:00 97.5 38 15 127/67 (87) 100   


 


4/16/18 07:00     94 Room Air  


 


4/16/18 06:00  66      


 


4/16/18 04:00 98.7 55 26 144/69 (94) 100   


 


4/16/18 04:00  55      


 


4/16/18 03:00     96 Nasal Cannula 3.00 


 


4/16/18 02:00  44      


 


4/16/18 00:00  74      


 


4/16/18 00:00 98.9 74 21 168/86 (113) 99   


 


4/15/18 23:00     99 Nasal Cannula 3.00 


 


4/15/18 23:00     99 Nasal Cannula 3.00 


 


4/15/18 22:00  65      


 


4/15/18 20:00 98.2 50 28 120/57 (78) 97   


 


4/15/18 20:00  50      


 


4/15/18 19:00     100 Nasal Cannula 3.00 


 


4/15/18 19:00     100 Nasal Cannula 3.00 


 


4/15/18 18:00  62      


 


4/15/18 16:00 98.3 46  106/61 (76) 98   


 


4/15/18 16:00  46      


 


4/15/18 15:00     100 Nasal Cannula 3.00 


 


4/15/18 14:00  63      














 4/16/18 4/16/18 4/16/18





 07:00 15:00 23:00


 


Intake Total 240 ml  


 


Output Total 1000 ml  


 


Balance -760 ml  








Result Diagram:  


4/16/18 0602 4/16/18 0602





Laboratory Results





Laboratory Tests








Test


  4/15/18


15:00 4/16/18


06:02


 


Hemoglobin 11.9 GM/DL  12.4 GM/DL 


 


Hematocrit 35.4 %  35.5 % 


 


White Blood Count  9.6 TH/MM3 


 


Red Blood Count  3.82 MIL/MM3 


 


Mean Corpuscular Volume  93.1 FL 


 


Mean Corpuscular Hemoglobin  32.5 PG 


 


Mean Corpuscular Hemoglobin


Concent 


  34.9 % 


 


 


Red Cell Distribution Width  14.6 % 


 


Platelet Count  183 TH/MM3 


 


Mean Platelet Volume  8.0 FL 


 


Blood Urea Nitrogen  19 MG/DL 


 


Creatinine  0.89 MG/DL 


 


Random Glucose  102 MG/DL 


 


Calcium Level  8.6 MG/DL 


 


Sodium Level  139 MEQ/L 


 


Potassium Level  4.1 MEQ/L 


 


Chloride Level  108 MEQ/L 


 


Carbon Dioxide Level  22.9 MEQ/L 


 


Anion Gap  8 MEQ/L 


 


Estimat Glomerular Filtration


Rate 


  82 ML/MIN 


 








Culture Results





Microbiology








 Date/Time


Source Procedure


Growth Status


 


 


 4/14/18 01:45


Blood Peripheral Aerobic Blood Culture - Preliminary


NO GROWTH IN 2 DAYS Resulted


 


 4/14/18 01:45


Blood Peripheral Anaerobic Blood Culture - Preliminary


NO GROWTH IN 2 DAYS Resulted





 4/14/18 01:40


Blood Peripheral Aerobic Blood Culture - Preliminary


NO GROWTH IN 2 DAYS Resulted


 


 4/14/18 01:40


Blood Peripheral Anaerobic Blood Culture - Preliminary


NO GROWTH IN 2 DAYS Resulted


 


 4/15/18 02:30


Urine Clean Catch Urine Culture - Preliminary


NO GROWTH IN 24 HOURS. Resulted











Administered Medications








 Medications


  (Trade)  Dose


 Ordered  Sig/Shantelle


 Route


 PRN Reason  Start Time


 Stop Time Status Last Admin


Dose Admin


 


 Metoprolol


 Tartrate


  (Lopressor)  25 mg  Q12HR


 PO


   4/14/18 09:00


   Future Hold 4/14/18 20:53


 


 


 Sodium Chloride


  (NS Flush)  2 ml  BID


 IV FLUSH


   4/14/18 09:00


    4/16/18 09:00


 


 


 Senna/Docusate


 Sodium


  (Rayna-Colace)  1 tab  BID


 PO


   4/14/18 09:00


    4/15/18 21:01


 


 


 Aspirin


  (Aspirin)  325 mg  DAILY


 PO


   4/14/18 09:00


   Future Hold 4/14/18 09:59


 


 


 Tamsulosin HCl


  (Flomax)  0.4 mg  HS


 PO


   4/14/18 21:00


    4/15/18 21:01


 


 


 Pravastatin Sodium


  (Pravachol)  80 mg  HS


 PO


   4/14/18 21:00


    4/15/18 21:01


 


 


 Losartan Potassium


  (Cozaar)  50 mg  DAILY


 PO


   4/15/18 09:00


    4/16/18 09:25


 


 


 Allopurinol


  (Zyloprim)  300 mg  DAILY


 PO


   4/15/18 09:00


    4/16/18 09:25


 


 


 Famotidine


  (Pepcid)  20 mg  BID


 PO


   4/15/18 09:00


    4/16/18 09:25


 


 


 Apixaban


  (Eliquis)  10 mg


 Taper  BID


 PO


   4/16/18 09:00


 5/14/18 08:59  4/16/18 09:25


 








Objective Remarks


GENERAL: elderly male, sitting up in bed in nad. 


SKIN: Warm and dry.


HEAD: Normocephalic.


EYES: No injection or drainage. 


NECK: Supple, trachea midline. 


CARDIOVASCULAR: +S1/S2


RESPIRATORY: Breath sounds equal bilaterally. No accessory muscle use.


GASTROINTESTINAL: Abdomen soft, non-tender, nondistended. 


EXTREMITIES: No cyanosis, or edema. 


MUSCULOSKELETAL: Adequate muscle tone.


NEUROLOGICAL: awake and alert. normal speech. moving all extremities.





Assessment/Plan


Problem List:  


(1) Pulmonary embolism, bilateral


ICD Codes:  I26.99 - Other pulmonary embolism without acute cor pulmonale


Status:  Acute


Plan:  --on Eliquis. 


--Extensive bilateral pulmonary embolism with a thin saddle embolus straddling 

the pulmonary artery bifurcation and extensive clot in the segmental and 

subsegmental vessels.  


++bilateral lower extremity DVT.  


--unprovoked-->will need to be on anticoagulation indefinitely. 





Assessment


81y/o male with extensive pulmonary emboli and DVT s/p TPA


history of hypertension and hyperlipidemia


h/o atrial fibrillation


Plan


1. continue Eliquis 10 mg p.o. b.i.d. x 7 days then Eliquis 5 mg p.o. b.i.d 

thereafter. 


2. recommend following up in clinic in 4-5 weeks post discharge---patient 

states he plans to follow up with Dr. Hatch his cardiologist for management of 

his Eliquis and any additional needed workup. 


3. continue supportive care











Corrie Mike Apr 16, 2018 13:14


Jean Toro MD Apr 16, 2018 22:26

## 2018-04-16 NOTE — ECHRPT
Indication:

 

 CONCLUSIONS

 Mildly dilated left ventricle. 

 Wall thickness is normal. 

 The left ventricular systolic function is moderate-to-severly reduced with an estimated ejection fra
ction in 

 the range of 35-40%. 

 The right ventricle is moderately dilated. 

 The right ventricular systoilc function is moderately decreased. 

 The left atrial size is mild-to-moderately dilated. 

 The right atrial size is mild-to-moderately dilated. 

 Mild mitral valve regurgitation. 

 Aortic valve sclerosis is present. 

 There is mild to moderate tricuspid valve regurgitation. 

 The estimated pulmonary arterial pressure is 67 mmHg. 

 Trivial pulmonary valve regurgitation. 

  

 

 

 BP:  136   / 69      HR: 63                       Rhythm:           Atrial fibrillation

 

                                                   Technical Quality:Fair

 

 FINDINGS

 

 LEFT VENTRICLE

 Mildly dilated left ventricle. 

 Wall thickness is normal. 

 The left ventricular systolic function is moderate-to-severly reduced with an estimated ejection fra
ction in 

 the range of 35-40%. 

 

 RIGHT VENTRICLE

 The right ventricle is moderately dilated. 

 The right ventricular systoilc function is moderately decreased. 

 

 LEFT ATRIUM

 The left atrial size is mild-to-moderately dilated. 

 

 RIGHT ATRIUM

 The right atrial size is mild-to-moderately dilated. 

 

 ATRIAL SEPTUM

 No atrial level shunt is demonstrated by color flow Doppler interrogation. 

 

 AORTA

 The aortic root and proximal ascending aorta are normal in size on limited imaging. 

 

 MITRAL VALVE

 Mild mitral valve regurgitation. 

 

 AORTIC VALVE

 Aortic valve sclerosis is present. 

 

 TRICUSPID VALVE

 There is mild to moderate tricuspid valve regurgitation. 

 The estimated pulmonary arterial pressure is 67 mmHg. 

 

 PULMONARY VALVE

 Trivial pulmonary valve regurgitation. 

 

 VESSELS

 The inferior vena cava is normal in size. 

 

 PERICARDIUM

 No pericardial effusion. 

 

 

 

 

  Adwoa Reyes MD, FACC

  Edited by:  

  (Electronically Signed)

  Final Date:14 April 2018 23:01

 

  Amended:   16 April 2018 11:08

## 2018-04-17 VITALS
RESPIRATION RATE: 18 BRPM | SYSTOLIC BLOOD PRESSURE: 119 MMHG | TEMPERATURE: 98.2 F | DIASTOLIC BLOOD PRESSURE: 56 MMHG | OXYGEN SATURATION: 97 % | HEART RATE: 54 BPM

## 2018-04-17 VITALS
OXYGEN SATURATION: 93 % | SYSTOLIC BLOOD PRESSURE: 175 MMHG | RESPIRATION RATE: 18 BRPM | TEMPERATURE: 98 F | HEART RATE: 54 BPM | DIASTOLIC BLOOD PRESSURE: 88 MMHG

## 2018-04-17 VITALS
OXYGEN SATURATION: 94 % | RESPIRATION RATE: 20 BRPM | DIASTOLIC BLOOD PRESSURE: 74 MMHG | TEMPERATURE: 97.6 F | SYSTOLIC BLOOD PRESSURE: 160 MMHG | HEART RATE: 62 BPM

## 2018-04-17 VITALS
HEART RATE: 69 BPM | DIASTOLIC BLOOD PRESSURE: 92 MMHG | TEMPERATURE: 97.7 F | RESPIRATION RATE: 18 BRPM | SYSTOLIC BLOOD PRESSURE: 188 MMHG | OXYGEN SATURATION: 98 %

## 2018-04-17 VITALS
HEART RATE: 51 BPM | RESPIRATION RATE: 18 BRPM | TEMPERATURE: 98.7 F | SYSTOLIC BLOOD PRESSURE: 141 MMHG | OXYGEN SATURATION: 96 % | DIASTOLIC BLOOD PRESSURE: 62 MMHG

## 2018-04-17 VITALS
TEMPERATURE: 97.8 F | HEART RATE: 71 BPM | RESPIRATION RATE: 18 BRPM | OXYGEN SATURATION: 97 % | SYSTOLIC BLOOD PRESSURE: 122 MMHG | DIASTOLIC BLOOD PRESSURE: 65 MMHG

## 2018-04-17 VITALS — HEART RATE: 76 BPM

## 2018-04-17 LAB
BUN SERPL-MCNC: 17 MG/DL (ref 7–18)
CALCIUM SERPL-MCNC: 8.7 MG/DL (ref 8.5–10.1)
CHLORIDE SERPL-SCNC: 103 MEQ/L (ref 98–107)
CHOLEST SERPL-MCNC: 119 MG/DL (ref 120–200)
CHOLESTEROL/ HDL RATIO: 3.06 RATIO
CREAT SERPL-MCNC: 0.9 MG/DL (ref 0.6–1.3)
ERYTHROCYTE [DISTWIDTH] IN BLOOD BY AUTOMATED COUNT: 14.9 % (ref 11.6–17.2)
GFR SERPLBLD BASED ON 1.73 SQ M-ARVRAT: 81 ML/MIN (ref 89–?)
GLUCOSE SERPL-MCNC: 136 MG/DL (ref 74–106)
HCO3 BLD-SCNC: 23.1 MEQ/L (ref 21–32)
HCT VFR BLD CALC: 37 % (ref 39–51)
HDLC SERPL-MCNC: 38.8 MG/DL (ref 40–60)
HGB BLD-MCNC: 12.7 GM/DL (ref 13–17)
LDLC SERPL-MCNC: 65 MG/DL (ref 0–99)
MCH RBC QN AUTO: 32.1 PG (ref 27–34)
MCHC RBC AUTO-ENTMCNC: 34.5 % (ref 32–36)
MCV RBC AUTO: 93.2 FL (ref 80–100)
PLATELET # BLD: 219 TH/MM3 (ref 150–450)
PMV BLD AUTO: 8 FL (ref 7–11)
RBC # BLD AUTO: 3.97 MIL/MM3 (ref 4.5–5.9)
SODIUM SERPL-SCNC: 136 MEQ/L (ref 136–145)
TRIGL SERPL-MCNC: 78 MG/DL (ref 42–150)
WBC # BLD AUTO: 10.1 TH/MM3 (ref 4–11)

## 2018-04-17 RX ADMIN — APIXABAN SCH MG: 5 TABLET, FILM COATED ORAL at 09:20

## 2018-04-17 RX ADMIN — LOSARTAN POTASSIUM SCH MG: 50 TABLET, FILM COATED ORAL at 09:20

## 2018-04-17 RX ADMIN — STANDARDIZED SENNA CONCENTRATE AND DOCUSATE SODIUM SCH TAB: 8.6; 5 TABLET, FILM COATED ORAL at 09:20

## 2018-04-17 RX ADMIN — FAMOTIDINE SCH MG: 20 TABLET, FILM COATED ORAL at 09:20

## 2018-04-17 RX ADMIN — STANDARDIZED SENNA CONCENTRATE AND DOCUSATE SODIUM SCH TAB: 8.6; 5 TABLET, FILM COATED ORAL at 22:25

## 2018-04-17 RX ADMIN — Medication SCH ML: at 09:21

## 2018-04-17 RX ADMIN — Medication SCH ML: at 22:27

## 2018-04-17 RX ADMIN — FAMOTIDINE SCH MG: 20 TABLET, FILM COATED ORAL at 22:25

## 2018-04-17 RX ADMIN — APIXABAN SCH MG: 5 TABLET, FILM COATED ORAL at 22:25

## 2018-04-17 RX ADMIN — PRAVASTATIN SODIUM SCH MG: 80 TABLET ORAL at 22:25

## 2018-04-17 RX ADMIN — TAMSULOSIN HYDROCHLORIDE SCH MG: 0.4 CAPSULE ORAL at 22:25

## 2018-04-17 RX ADMIN — TAMSULOSIN HYDROCHLORIDE SCH MG: 0.4 CAPSULE ORAL at 09:20

## 2018-04-17 RX ADMIN — ALLOPURINOL SCH MG: 300 TABLET ORAL at 09:20

## 2018-04-17 NOTE — PD.CONS
HPI


Service


Urology


Consult Requested By


Dr Elliott


Reason for Consult


Urinary retention


Primary Care Physician


Silva Katz MD


Diagnosis:  


(1) Syncope


ICD Code:  R55 - Syncope and collapse


(2) Arrhythmia


ICD Code:  I49.9 - Cardiac arrhythmia, unspecified


(3) PNA (pneumonia)


ICD Code:  J18.9 - Pneumonia, unspecified organism


History of Present Illness


This is an 82-year-old male with a PMH of HTN who is brought to the ER by EMS 

on 4/14/18 after an apparent syncopal event.  No seizure activity reported.  No 

previous h/o similar symptoms.  Upon EMS arrival, pt noted to be in bigeminy, 

given Lidocaine w/ conversion to A-fib.  Pt states he's been following w/ his 

PCP, Dr. Katz, for SOB / CHF which started approx 2mo ago, at that time 

was referred for EKG and states the tech told him he was in A-fib, however 

never officially diagnosed. He is started treatment of pneumonia and CArdiology 

ordered CTA which was c/w extensive PE on description with thin SADDLE  

embolus. Also has DVT. He is s/p TPA. Also he is on Eliquis. Noted to be in 

retention since his admission to the hospital and required str cath. Admits s/

pubic strong pain and inability to void. Last Str cath drained 1000cc.


 Pt is known to our urology service and he is a pt of [facility]. He was seen 

several month ago last time. He has h/o elevated PSA with negative biopsy and 

BPH with incomplete bladder emptying. Last time had PVR of almost 400cc but 

refused any intervention and said that feel ok. Takes flomax daily, while in 

the hospital it was increased to 2 pills. no f/c/n/v, no hematuria. Pt was able 

to void at 3pm today small amount of urine after having BM.





Review of Systems


Except as stated in HPI:  all other systems reviewed are Neg





Past Family Social History


Past Medical History


PMH: HTN, Arrhythmia, BPH


Past Surgical History


Bilateral Knee Replacement


Allergies:  


Coded Allergies:  


     No Known Allergies (Verified  Allergy, Unknown, 4/13/18)


Family History


Reviewed.  No h/o DM or CAD


Social History


Occasional alcohol.  Negative for tobacco or drugs





Physical Exam





Vital Signs








  Date Time  Temp Pulse Resp B/P (MAP) Pulse Ox O2 Delivery O2 Flow Rate FiO2


 


4/17/18 11:17  76      


 


4/17/18 08:00 97.7 69 18 188/92 (124) 98   


 


4/17/18 04:00 98.0 54 18 175/88 (117) 93   


 


4/17/18 00:00 97.6 62 20 160/74 (102) 94   


 


4/16/18 20:00 97.4 63 18 184/97 (126) 94   


 


4/16/18 16:00 97.9 57 18 180/88 (118) 97   








Physical Exam


GENERAL: This is a well-nourished, well-developed patient, in no apparent 

distress.


HEAD: Atraumatic. Normocephalic. 


NECK:. Supple, nontender, 


CARDIOVASCULAR: Regular rate and rhythm without murmurs


RESPIRATORY: Clear to auscultation. Breath sounds equal bilaterally. No wheezes

, rales, or rhonchi.  


GASTROINTESTINAL: Abdomen soft, non-tender, nondistended.


GENITOURINARY: Bladder not distended, normal genital exam


MUSCULOSKELETAL: Extremities without clubbing, cyanosis, or edema.


NEUROLOGICAL: Awake and alert.


Lab results reviewed:  Yes





Laboratory Tests








Test


  4/17/18


06:44


 


White Blood Count 10.1 


 


Red Blood Count 3.97 


 


Hemoglobin 12.7 


 


Hematocrit 37.0 


 


Mean Corpuscular Volume 93.2 


 


Mean Corpuscular Hemoglobin 32.1 


 


Mean Corpuscular Hemoglobin


Concent 34.5 


 


 


Red Cell Distribution Width 14.9 


 


Platelet Count 219 


 


Mean Platelet Volume 8.0 


 


Blood Urea Nitrogen 17 


 


Creatinine 0.90 


 


Random Glucose 136 


 


Calcium Level 8.7 


 


Sodium Level 136 


 


Potassium Level 3.7 


 


Chloride Level 103 


 


Carbon Dioxide Level 23.1 


 


Anion Gap 10 


 


Estimat Glomerular Filtration


Rate 81 


 


 


Triglycerides Level 78 


 


Cholesterol Level 119 


 


LDL Cholesterol 65 


 


HDL Cholesterol 38.8 


 


Cholesterol/HDL Ratio 3.06 














 Date/Time


Source Procedure


Growth Status


 


 


 4/14/18 01:45


Blood Peripheral Aerobic Blood Culture - Preliminary


NO GROWTH IN 3 DAYS Resulted


 


 4/14/18 01:45


Blood Peripheral Anaerobic Blood Culture - Preliminary


NO GROWTH IN 3 DAYS Resulted


 


 4/15/18 02:30


Urine Clean Catch Urine Culture - Final


NO GROWTH IN 48 HOURS. Complete








Result Diagram:  


4/17/18 0644                                                                   

             4/17/18 0644





Personally reviewed images:  Yes


Imaging





Last Impressions








CT Angiography 4/14/18 1505 Signed





Impressions: 





 Service Date/Time:  Saturday, April 14, 2018 15:51 - CONCLUSION:  Extensive 





 bilateral pulmonary embolism.      Austen Olmos MD 


 


Lower Extremity Ultrasound 4/14/18 0000 Signed





Impressions: 





 Service Date/Time:  Saturday, April 14, 2018 17:23 - CONCLUSION:  Bilateral 





 lower extremity DVT as above.     Austen Goncalves MD 


 


Head CT 4/13/18 2337 Signed





Impressions: 





 Service Date/Time:  Saturday, April 14, 2018 00:02 - CONCLUSION:  1. No 

evidence 





 of acute intracranial pathology. No masses are identified.     David Armstrong MD 


 


Chest X-Ray 4/13/18 2337 Signed





Impressions: 





 Service Date/Time:  Friday, April 13, 2018 23:57 - CONCLUSION:  1. 

Cardiomegaly 





 2.  Opacity laterally in the right upper lobe characteristic of pneumonia. 





 Followup examination to insure clearing is recommended.       David Armstrong MD 











Assessment and Plan


Assessment and Plan


82 y.o male with PE, DVT Afib, s/p tpa with urinary retention now


Continue care as per primary team


No acute  intervention needed


Let pt try to void and perform bladder scan after voiding


If retains 400cc or more place marcelino catheter ( order placed in case needed) 

and pt will need to be d/c with it


Continue Flomax bid, add proscar 5mg a day


Pt to follow up with Dr Lancaster after d/c for further evaluation in clinic


Discussed Condition With


Dr Maikel LAGUERRE attending who agrees with this plan





Problem Qualifiers





(1) Syncope:  


Qualified Codes:  R55 - Syncope and collapse


(2) Arrhythmia:  


Qualified Codes:  I49.9 - Cardiac arrhythmia, unspecified


(3) PNA (pneumonia):  


Qualified Codes:  J18.9 - Pneumonia, unspecified organism








Chalino Champagne Apr 17, 2018 15:28

## 2018-04-17 NOTE — HHI.PR
Subjective


Remarks


Follow-up for submassive PE. Patient is currently doing well. However, he is 

unable urinate on his own. No fever, chills.





Objective


Vitals





Vital Signs








  Date Time  Temp Pulse Resp B/P (MAP) Pulse Ox O2 Delivery O2 Flow Rate FiO2


 


4/17/18 08:00 97.7 69 18 188/92 (124) 98   


 


4/17/18 04:00 98.0 54 18 175/88 (117) 93   


 


4/17/18 00:00 97.6 62 20 160/74 (102) 94   


 


4/16/18 20:00 97.4 63 18 184/97 (126) 94   


 


4/16/18 16:00 97.9 57 18 180/88 (118) 97   


 


4/16/18 12:30     97 Room Air  


 


4/16/18 12:00 97.3 73 18 128/60 (82) 97   


 


4/16/18 11:00     94 Room Air  














I/O      


 


 4/16/18 4/16/18 4/16/18 4/17/18 4/17/18 4/17/18





 07:00 15:00 23:00 07:00 15:00 23:00


 


Intake Total 240 ml     


 


Output Total 1000 ml  450 ml 1300 ml  


 


Balance -760 ml  -450 ml -1300 ml  


 


      


 


Intake Oral 240 ml     


 


Output Urine Total 1000 ml  450 ml 1300 ml  


 


Bladder Scan Volume Amount    702 ml  


 


# Bowel Movements 0    3 








Result Diagram:  


4/17/18 0644                                                                   

             4/17/18 0644





Imaging





Last Impressions








CT Angiography 4/14/18 1505 Signed





Impressions: 





 Service Date/Time:  Saturday, April 14, 2018 15:51 - CONCLUSION:  Extensive 





 bilateral pulmonary embolism.      Austen Olmos MD 


 


Lower Extremity Ultrasound 4/14/18 0000 Signed





Impressions: 





 Service Date/Time:  Saturday, April 14, 2018 17:23 - CONCLUSION:  Bilateral 





 lower extremity DVT as above.     Austen Goncalves MD 


 


Head CT 4/13/18 6975 Signed





Impressions: 





 Service Date/Time:  Saturday, April 14, 2018 00:02 - CONCLUSION:  1. No 

evidence 





 of acute intracranial pathology. No masses are identified.     David Armstrong MD 


 


Chest X-Ray 4/13/18 2337 Signed





Impressions: 





 Service Date/Time:  Friday, April 13, 2018 23:57 - CONCLUSION:  1. 

Cardiomegaly 





 2.  Opacity laterally in the right upper lobe characteristic of pneumonia. 





 Followup examination to insure clearing is recommended.       David Armstrong MD 








Objective Remarks


GENERAL: Alert, NAD. 


SKIN: Warm and dry.


HEAD: Normocephalic.


EYES: No scleral icterus. No injection or drainage. 


NECK: Supple, trachea midline. No JVD or lymphadenopathy.


CARDIOVASCULAR: Regular rate and rhythm without murmurs, gallops, or rubs. 


RESPIRATORY: Breath sounds equal bilaterally. No accessory muscle use.


GASTROINTESTINAL: Abdomen soft, non-tender, nondistended. 


MUSCULOSKELETAL: No cyanosis, or edema. 


BACK: Nontender without obvious deformity. No CVA tenderness.








A/P


Problem List:  


(1) Syncope


ICD Code:  R55 - Syncope and collapse


(2) Arrhythmia


ICD Code:  I49.9 - Cardiac arrhythmia, unspecified


(3) PNA (pneumonia)


ICD Code:  J18.9 - Pneumonia, unspecified organism


Assessment and Plan


Mr. Pena is an 82-year-old male with a PMH of HTN who is brought to the ER 

by EMS after an apparent syncopal event.  Per patient, he had gone to the 

bathroom and when he got up from the toilet felt significant dizziness and SOB, 

states he tried to sit at the edge of the tub to catch his breath. During 

hospitalization, a CT chest showed extensive bilateral pulmonary embolism.  

Patient was evaluated by critical care team and after long discussion patient 

received TPA for submassive PE.  His care was transferred to hospitalist 

service on 4/17/2018.  Patient has been doing well.  He had urinary retention 

on 4/17/2018 and required straight cath.  We consulted urology who recommended 

Solano catheter placement if patient continues to retain fluid about 400 cc.





Submassive pulmonary embolism bilateral


Right heart strain


Atrial fibrillation rate controlled


   Status post TPA.  Currently patient is on apixaban 10 mg twice a day and 

then eventually will switch to 5 mg twice a day.


   Currently on room air, ambulating well without any difficulty.


   Patient follows up with cardiology in the outpatient setting.





BPH


Urinary retention


   Appreciate neurology evaluation.  Patient can follow-up with his outpatient 

urologist.


   Patient is now urinating on his own.  We will do a postvoid bladder scan


   Patient is not very keen on getting a Solano catheter. 


   He is more interested to see how he does and will follow up with his 

outpatient neurologist next week.


   Continue Flomax 0.4 mg twice daily.





Full code.  Apixaban.





Problem Qualifiers





(1) Syncope:  


Qualified Codes:  R55 - Syncope and collapse


(2) Arrhythmia:  


Qualified Codes:  I49.9 - Cardiac arrhythmia, unspecified


(3) PNA (pneumonia):  


Qualified Codes:  J18.9 - Pneumonia, unspecified organism








Nancy Elliott DO Apr 17, 2018 10:01 am

## 2018-04-18 VITALS
DIASTOLIC BLOOD PRESSURE: 66 MMHG | HEART RATE: 56 BPM | TEMPERATURE: 97.9 F | OXYGEN SATURATION: 90 % | SYSTOLIC BLOOD PRESSURE: 157 MMHG | RESPIRATION RATE: 18 BRPM

## 2018-04-18 VITALS
HEART RATE: 42 BPM | RESPIRATION RATE: 18 BRPM | DIASTOLIC BLOOD PRESSURE: 65 MMHG | TEMPERATURE: 97.5 F | OXYGEN SATURATION: 93 % | SYSTOLIC BLOOD PRESSURE: 145 MMHG

## 2018-04-18 VITALS
DIASTOLIC BLOOD PRESSURE: 62 MMHG | TEMPERATURE: 97.4 F | RESPIRATION RATE: 18 BRPM | HEART RATE: 52 BPM | OXYGEN SATURATION: 97 % | SYSTOLIC BLOOD PRESSURE: 133 MMHG

## 2018-04-18 VITALS
SYSTOLIC BLOOD PRESSURE: 123 MMHG | TEMPERATURE: 98.1 F | OXYGEN SATURATION: 97 % | RESPIRATION RATE: 18 BRPM | DIASTOLIC BLOOD PRESSURE: 62 MMHG | HEART RATE: 71 BPM

## 2018-04-18 VITALS
OXYGEN SATURATION: 97 % | TEMPERATURE: 97.7 F | RESPIRATION RATE: 20 BRPM | SYSTOLIC BLOOD PRESSURE: 145 MMHG | DIASTOLIC BLOOD PRESSURE: 68 MMHG | HEART RATE: 43 BPM

## 2018-04-18 LAB
BUN SERPL-MCNC: 18 MG/DL (ref 7–18)
CALCIUM SERPL-MCNC: 9.1 MG/DL (ref 8.5–10.1)
CHLORIDE SERPL-SCNC: 106 MEQ/L (ref 98–107)
CREAT SERPL-MCNC: 0.96 MG/DL (ref 0.6–1.3)
ERYTHROCYTE [DISTWIDTH] IN BLOOD BY AUTOMATED COUNT: 14.6 % (ref 11.6–17.2)
GFR SERPLBLD BASED ON 1.73 SQ M-ARVRAT: 75 ML/MIN (ref 89–?)
GLUCOSE SERPL-MCNC: 118 MG/DL (ref 74–106)
HCO3 BLD-SCNC: 24.1 MEQ/L (ref 21–32)
HCT VFR BLD CALC: 35.3 % (ref 39–51)
HGB BLD-MCNC: 12.1 GM/DL (ref 13–17)
MCH RBC QN AUTO: 31.9 PG (ref 27–34)
MCHC RBC AUTO-ENTMCNC: 34.4 % (ref 32–36)
MCV RBC AUTO: 92.8 FL (ref 80–100)
PLATELET # BLD: 271 TH/MM3 (ref 150–450)
PMV BLD AUTO: 7.5 FL (ref 7–11)
RBC # BLD AUTO: 3.8 MIL/MM3 (ref 4.5–5.9)
SODIUM SERPL-SCNC: 138 MEQ/L (ref 136–145)
WBC # BLD AUTO: 10.4 TH/MM3 (ref 4–11)

## 2018-04-18 RX ADMIN — TAMSULOSIN HYDROCHLORIDE SCH MG: 0.4 CAPSULE ORAL at 09:40

## 2018-04-18 RX ADMIN — LOSARTAN POTASSIUM SCH MG: 50 TABLET, FILM COATED ORAL at 09:40

## 2018-04-18 RX ADMIN — STANDARDIZED SENNA CONCENTRATE AND DOCUSATE SODIUM SCH TAB: 8.6; 5 TABLET, FILM COATED ORAL at 09:40

## 2018-04-18 RX ADMIN — FAMOTIDINE SCH MG: 20 TABLET, FILM COATED ORAL at 09:40

## 2018-04-18 RX ADMIN — ALLOPURINOL SCH MG: 300 TABLET ORAL at 09:40

## 2018-04-18 RX ADMIN — Medication SCH ML: at 09:41

## 2018-04-18 RX ADMIN — APIXABAN SCH MG: 5 TABLET, FILM COATED ORAL at 09:40

## 2018-04-18 NOTE — HHI.DS
__________________________________________________





Discharge Summary


Admission Date


Apr 14, 2018 at 16:27


Discharge Date:  Apr 18, 2018


Admitting Diagnosis





SYNCOPE,CHF,PNA





(1) Syncope


ICD Code:  R55 - Syncope and collapse


(2) Arrhythmia


ICD Code:  I49.9 - Cardiac arrhythmia, unspecified


(3) PNA (pneumonia)


ICD Code:  J18.9 - Pneumonia, unspecified organism


(4) Pulmonary embolism, bilateral


ICD Code:  I26.99 - Other pulmonary embolism without acute cor pulmonale


Diagnosis:  Principal


Status:  Acute


Procedures


Received tPA.


Brief History - From Admission


This is an 82-year-old male with a PMH of HTN who is brought to the ER by EMS 

after an apparent syncopal event.  Per patient, he had gone to the bathroom and 

when he got up from the toilet felt significant dizziness and SOB, states he 

tried to sit at the edge of the tub to catch his breath, however Son reports pt 

had syncopal event.  No seizure activity reported.  No previous h/o similar 

symptoms.  Upon EMS arrival, pt noted to be in bigeminy, given Lidocaine w/ 

conversion to A-fib.  Pt states he's been following w/ his PCP, Dr. Katz, 

for SOB which started approx 2mo ago, at that time was referred for EKG and 

states the tech told him he was in A-fib, however never officially diagnosed.  

Was supposed to be referred to Dr. Duncan on Monday for further evaluation.  

Pt notes progressive SOB, first 100ft, then 50ft, now reports SOB w/ few feet.  

Denies fever, chills.  Reports occasional non-productive cough.  No edema.  On 

arrival, /75, HR 68, O2 sat 95% on 2L NC, Afebrile.  WBC 11.4.  Platelets 

149, no previous labs for comparison.  BUN 22, GFR 61.  .  Troponin 

0.04.  INR 1.2.  CXR with cardiomegaly, opacity right upper lobe characteristic 

of pneumonia.  CT Head with no acute findings.  S/p Rocephin/Zithro in ER.  

Currently without complaints.


CBC/BMP:  


4/18/18 0626                                                                   

             4/18/18 0626





Significant Findings





Laboratory Tests








Test


  4/16/18


06:02 4/17/18


06:44 4/18/18


06:26


 


Red Blood Count


  3.82 MIL/MM3


(4.50-5.90) 3.97 MIL/MM3


(4.50-5.90) 3.80 MIL/MM3


(4.50-5.90)


 


Hemoglobin


  12.4 GM/DL


(13.0-17.0) 12.7 GM/DL


(13.0-17.0) 12.1 GM/DL


(13.0-17.0)


 


Hematocrit


  35.5 %


(39.0-51.0) 37.0 %


(39.0-51.0) 35.3 %


(39.0-51.0)


 


Blood Urea Nitrogen


  19 MG/DL


(7-18) 


  


 


 


Chloride Level


  108 MEQ/L


() 


  


 


 


Estimat Glomerular Filtration


Rate 82 ML/MIN


(>89) 81 ML/MIN


(>89) 75 ML/MIN


(>89)


 


Random Glucose


  


  136 MG/DL


() 118 MG/DL


()


 


Cholesterol Level


  


  119 MG/DL


(120-200) 


 


 


HDL Cholesterol


  


  38.8 MG/DL


(40.0-60.0) 


 








Imaging





Last Impressions








CT Angiography 4/14/18 1505 Signed





Impressions: 





 Service Date/Time:  Saturday, April 14, 2018 15:51 - CONCLUSION:  Extensive 





 bilateral pulmonary embolism.      Austen Olmos MD 


 


Lower Extremity Ultrasound 4/14/18 0000 Signed





Impressions: 





 Service Date/Time:  Saturday, April 14, 2018 17:23 - CONCLUSION:  Bilateral 





 lower extremity DVT as above.     Austen Goncalves MD 


 


Head CT 4/13/18 2337 Signed





Impressions: 





 Service Date/Time:  Saturday, April 14, 2018 00:02 - CONCLUSION:  1. No 

evidence 





 of acute intracranial pathology. No masses are identified.     David Armstrong MD 


 


Chest X-Ray 4/13/18 2337 Signed





Impressions: 





 Service Date/Time:  Friday, April 13, 2018 23:57 - CONCLUSION:  1. 

Cardiomegaly 





 2.  Opacity laterally in the right upper lobe characteristic of pneumonia. 





 Followup examination to insure clearing is recommended.       David Armstrong MD 








PE at Discharge


GENERAL: Alert, NAD. 


SKIN: Warm and dry.


HEAD: Normocephalic.


EYES: No scleral icterus. No injection or drainage. 


NECK: Supple, trachea midline. No JVD or lymphadenopathy.


CARDIOVASCULAR: Regular rate and rhythm without murmurs, gallops, or rubs. 


RESPIRATORY: Breath sounds equal bilaterally. No accessory muscle use.


GASTROINTESTINAL: Abdomen soft, non-tender, nondistended. 


MUSCULOSKELETAL: No cyanosis, or edema. 


BACK: Nontender without obvious deformity. No CVA tenderness.





Pt update on day of discharge


Patient is currently doing well. No acute concerns, on room air. He has been 

able to urinate on his own but not complete void. He does not want to get a 

Solano cath when he goes home.


Hospital Course


Mr. Pena is an 82-year-old male with a PMH of HTN who is brought to the ER 

by EMS after an apparent syncopal event.  Per patient, he had gone to the 

bathroom and when he got up from the toilet felt significant dizziness and SOB, 

states he tried to sit at the edge of the tub to catch his breath. During 

hospitalization, a CT chest showed extensive bilateral pulmonary embolism.  

Patient was evaluated by critical care team and after long discussion patient 

received TPA for submassive PE.  His care was transferred to hospitalist 

service on 4/17/2018.  Patient has been doing well.  He had urinary retention 

on 4/17/2018 and required straight cath.  We consulted urology who recommended 

Solano catheter placement if patient continues to retain fluid about 400 cc.





Submassive pulmonary embolism bilateral


Right heart strain


Atrial fibrillation rate controlled


   Status post TPA.  Currently patient is on apixaban 10 mg twice a day X 7 

days total and then 5mg BID. 


   Currently on room air, ambulating well without any difficulty.


   Patient follows up with cardiology in the outpatient setting.





BPH


Urinary retention


   Appreciate neurology evaluation.  Patient can follow-up with his outpatient 

urologist.


   Patient is now urinating on his own.  We will do a postvoid bladder scan


   Patient is not very keen on getting a Solano catheter. 


   He is more interested to see how he does and will follow up with his 

outpatient neurologist next week.


   Continue Flomax 0.4 mg twice daily.





Full code.  Apixaban.


Pt Condition on Discharge:  Good


Discharge Disposition:  Discharge Home


Discharge Time:  > 30 minutes


Discharge Instructions


DIET: Follow Instructions for:  Heart Healthy Diet


Activities you can perform:  Regular-No Restrictions


Follow up Referrals:  


Appointment for Follow Up


Cardiology - 3 Weeks with Jaylan Fuller MD


Cardiology, Interventional


PCP Follow-up - 1 Week


PCP Follow-up


Urology - 3-5 Days with Juan Jose Lancaster M.d.





New Medications:  


Apixaban (Eliquis) 5 Mg Tab


5 MG PO BID for Blood Clot Prevention, #70 TAB 0 Refills


Take 10mg (two 5mg tablets) Twice a day through 4/22/2018. THEN start


 5mg twice a day.


 


Continued Medications:  


Allopurinol (Allopurinol) 300 Mg Tab


300 MG PO DAILY for Gout, #30 TAB 0 Refills





Cholecalciferol (Vitamin D-3) 1,000 Unit Cap








Cranberry Conc/C/Bacill Coag (Cranberry Tablet) 450 Mg-30 Mg-50 Million Cell 

Tablet








Losartan (Losartan) 50 Mg Tab


50 MG PO DAILY for Blood Pressure Management, #30 TAB 0 Refills





Simvastatin (Simvastatin) 40 Mg Tab


40 MG PO HS for Cholesterol Management, #30 TAB 0 Refills





Tamsulosin (Flomax) 0.4 Mg Cap


0.4 MG PO HS for Manage Prostate Problems, #30 CAP 0 Refills





 


Discontinued Medications:  


Aspirin (Aspirin Low Dose) 81 Mg Chew


325 MG CHEW DAILY, TAB 0 Refills

















Nancy Elliott DO Apr 18, 2018 6:08 pm
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
